# Patient Record
Sex: FEMALE | Race: WHITE | NOT HISPANIC OR LATINO | Employment: UNEMPLOYED | URBAN - METROPOLITAN AREA
[De-identification: names, ages, dates, MRNs, and addresses within clinical notes are randomized per-mention and may not be internally consistent; named-entity substitution may affect disease eponyms.]

---

## 2019-05-02 ENCOUNTER — HOSPITAL ENCOUNTER (OUTPATIENT)
Dept: GENERAL RADIOLOGY | Facility: HOSPITAL | Age: 54
Discharge: HOME | End: 2019-05-02
Attending: INTERNAL MEDICINE

## 2019-05-31 ENCOUNTER — HOSPITAL ENCOUNTER (OUTPATIENT)
Dept: OTHER | Facility: HOSPITAL | Age: 54
Discharge: HOME | End: 2019-05-31
Attending: INTERNAL MEDICINE

## 2019-05-31 LAB
ALBUMIN SERPL-MCNC: 4 G/DL (ref 3.5–5)
ALBUMIN/GLOB SERPL: 1.3 {RATIO} (ref 1.4–2.6)
ALP SERPL-CCNC: 45 U/L (ref 53–141)
ALT SERPL-CCNC: 8 U/L (ref 10–40)
ANION GAP SERPL CALC-SCNC: 14 MMOL/L (ref 8–19)
AST SERPL-CCNC: 17 U/L (ref 15–50)
BASOPHILS # BLD AUTO: 0.02 10*3/UL (ref 0–0.2)
BASOPHILS NFR BLD AUTO: 0.4 % (ref 0–3)
BILIRUB SERPL-MCNC: 0.22 MG/DL (ref 0.2–1.3)
BUN SERPL-MCNC: 8 MG/DL (ref 5–25)
BUN/CREAT SERPL: 9 {RATIO} (ref 6–20)
CALCIUM SERPL-MCNC: 9.3 MG/DL (ref 8.7–10.4)
CHLORIDE SERPL-SCNC: 100 MMOL/L (ref 99–111)
CONV ABS IMM GRAN: 0.01 10*3/UL (ref 0–0.2)
CONV CO2: 26 MMOL/L (ref 22–32)
CONV IMMATURE GRAN: 0.2 % (ref 0–1.8)
CONV TOTAL PROTEIN: 7.1 G/DL (ref 6.3–8.2)
CREAT UR-MCNC: 0.86 MG/DL (ref 0.5–0.9)
CRP SERPL-MCNC: 5.2 MG/L (ref 0–5)
DEPRECATED RDW RBC AUTO: 42.7 FL (ref 36.4–46.3)
EOSINOPHIL # BLD AUTO: 0.34 10*3/UL (ref 0–0.7)
EOSINOPHIL # BLD AUTO: 7.2 % (ref 0–7)
ERYTHROCYTE [DISTWIDTH] IN BLOOD BY AUTOMATED COUNT: 12.5 % (ref 11.7–14.4)
ERYTHROCYTE [SEDIMENTATION RATE] IN BLOOD: 8 MM/H (ref 0–30)
GFR SERPLBLD BASED ON 1.73 SQ M-ARVRAT: >60 ML/MIN/{1.73_M2}
GLOBULIN UR ELPH-MCNC: 3.1 G/DL (ref 2–3.5)
GLUCOSE SERPL-MCNC: 107 MG/DL (ref 65–99)
HBA1C MFR BLD: 11.2 G/DL (ref 12–16)
HCT VFR BLD AUTO: 33.4 % (ref 37–47)
LYMPHOCYTES # BLD AUTO: 1.47 10*3/UL (ref 1–5)
MCH RBC QN AUTO: 31.1 PG (ref 27–31)
MCHC RBC AUTO-ENTMCNC: 33.5 G/DL (ref 33–37)
MCV RBC AUTO: 92.8 FL (ref 81–99)
MONOCYTES # BLD AUTO: 0.24 10*3/UL (ref 0.2–1.2)
MONOCYTES NFR BLD AUTO: 5.1 % (ref 3–10)
NEUTROPHILS # BLD AUTO: 2.64 10*3/UL (ref 2–8)
NEUTROPHILS NFR BLD AUTO: 56 % (ref 30–85)
NRBC CBCN: 0 % (ref 0–0.7)
OSMOLALITY SERPL CALC.SUM OF ELEC: 281 MOSM/KG (ref 273–304)
PLATELET # BLD AUTO: 291 10*3/UL (ref 130–400)
PMV BLD AUTO: 9.6 FL (ref 9.4–12.3)
POTASSIUM SERPL-SCNC: 4 MMOL/L (ref 3.5–5.3)
RBC # BLD AUTO: 3.6 10*6/UL (ref 4.2–5.4)
RETICS # AUTO: 0.05 10*6/UL (ref 0.02–0.08)
RETICS/RBC NFR AUTO: 1.5 % (ref 0.5–1.7)
SODIUM SERPL-SCNC: 136 MMOL/L (ref 135–147)
TSH SERPL-ACNC: 0.85 M[IU]/L (ref 0.27–4.2)
VARIANT LYMPHS NFR BLD MANUAL: 31.1 % (ref 20–45)
WBC # BLD AUTO: 4.72 10*3/UL (ref 4.8–10.8)

## 2019-06-03 LAB
A FUMIGATUS AB SER QL ID: <0.1 K[IU]/ML
BERMUDA GRASS IGE QN: <0.1 K[IU]/ML (ref 0–0.35)
CAT DANDER IGG QN: <0.1 K[IU]/ML (ref 0–0.35)
CLADOSPORIUM IGE: <0.1 K[IU]/ML
COMMON RAGWEED IGE QN: <0.1 K[IU]/ML (ref 0–0.35)
COTTONWOOD IGE QN: <0.1 K[IU]/ML
COW EPITH IGE QN: <0.1 K[IU]/ML
CULTIVATED RYE IGE QN: <0.1
D FARINAE IGE QN: <0.1 K[IU]/ML (ref 0–0.35)
D PTERONYSS IGE QN: <0.1 K[IU]/ML (ref 0–0.35)
DOG DANDER IGE QN: <0.1 K[IU]/ML (ref 0–0.35)
ENGL PLANTAIN IGE QN: <0.1
GOOSEFOOT IGE QN: <0.1 K[IU]/ML (ref 0–0.35)
HORSE EPITH IGE QN: <0.1 K[IU]/ML
HOUSE DUST GREER IGE QN: <0.1 K[IU]/ML (ref 0–0.35)
IGE SERPL-ACNC: 12 K[IU]/ML (ref 0–24)
IMMUNOCAP RESULT: NORMAL (ref 0–0)
JOHNSON GRASS IGE QN: <0.1 K[IU]/ML (ref 0–0.35)
MEADOW FESCUE IGE QN: <0.1 K[IU]/ML (ref 0–0.35)
MESQUITE IGE QN: <0.1
MOLD IGE: <0.1 K[IU]/ML (ref 0–0.35)
MT JUNIPER IGE QN: <0.1 K[IU]/ML
MUGWORT IGE QN: <0.1
OAK DUST IGE QN: <0.1 K[IU]/ML (ref 0–0.35)
OLIVE POLN IGE QN: <0.1
P NOTATUM IGE QN: <0.1 K[IU]/ML
SALTWORT IGE QN: <0.1
TIMOTHY IGE QN: <0.1 K[IU]/ML
WHITE ELM IGE QN: <0.1 K[IU]/ML (ref 0–0.35)

## 2019-06-06 ENCOUNTER — HOSPITAL ENCOUNTER (OUTPATIENT)
Dept: GENERAL RADIOLOGY | Facility: HOSPITAL | Age: 54
Discharge: HOME | End: 2019-06-06
Attending: OBSTETRICS & GYNECOLOGY

## 2019-09-24 ENCOUNTER — HOSPITAL ENCOUNTER (OUTPATIENT)
Dept: OTHER | Facility: HOSPITAL | Age: 54
Discharge: HOME | End: 2019-09-24
Attending: INTERNAL MEDICINE

## 2019-09-26 LAB
BACTERIA SPEC AEROBE CULT: ABNORMAL
CIPROFLOXACIN SUSC ISLT: >=8
CLINDAMYCIN SUSC ISLT: 0.25
DOXYCYCLINE SUSC ISLT: <=0.5
ERYTHROMYCIN SUSC ISLT: >=8
GENTAMICIN SUSC ISLT: <=0.5
LEVOFLOXACIN SUSC ISLT: >=8
OXACILLIN SUSC ISLT: 0.5
RIFAMPIN SUSC ISLT: <=0.5
TETRACYCLINE SUSC ISLT: <=1
TMP SMX SUSC ISLT: <=10
VANCOMYCIN SUSC ISLT: 1

## 2019-11-26 ENCOUNTER — HOSPITAL ENCOUNTER (OUTPATIENT)
Dept: OTHER | Facility: HOSPITAL | Age: 54
Discharge: HOME | End: 2019-11-26
Attending: INTERNAL MEDICINE

## 2019-11-26 LAB
BASOPHILS # BLD AUTO: 0.06 10*3/UL (ref 0–0.2)
BASOPHILS NFR BLD AUTO: 0.8 % (ref 0–3)
CONV ABS IMM GRAN: 0.02 10*3/UL (ref 0–0.2)
CONV IMMATURE GRAN: 0.3 % (ref 0–1.8)
DEPRECATED RDW RBC AUTO: 41.1 FL (ref 36.4–46.3)
EOSINOPHIL # BLD AUTO: 1.24 10*3/UL (ref 0–0.7)
EOSINOPHIL # BLD AUTO: 15.7 % (ref 0–7)
ERYTHROCYTE [DISTWIDTH] IN BLOOD BY AUTOMATED COUNT: 12.2 % (ref 11.7–14.4)
HCT VFR BLD AUTO: 37 % (ref 37–47)
HGB BLD-MCNC: 12.4 G/DL (ref 12–16)
LYMPHOCYTES # BLD AUTO: 2.41 10*3/UL (ref 1–5)
LYMPHOCYTES NFR BLD AUTO: 30.4 % (ref 20–45)
MCH RBC QN AUTO: 30.9 PG (ref 27–31)
MCHC RBC AUTO-ENTMCNC: 33.5 G/DL (ref 33–37)
MCV RBC AUTO: 92.3 FL (ref 81–99)
MONOCYTES # BLD AUTO: 0.51 10*3/UL (ref 0.2–1.2)
MONOCYTES NFR BLD AUTO: 6.4 % (ref 3–10)
NEUTROPHILS # BLD AUTO: 3.68 10*3/UL (ref 2–8)
NEUTROPHILS NFR BLD AUTO: 46.4 % (ref 30–85)
NRBC CBCN: 0 % (ref 0–0.7)
PLATELET # BLD AUTO: 353 10*3/UL (ref 130–400)
PMV BLD AUTO: 9.3 FL (ref 9.4–12.3)
RBC # BLD AUTO: 4.01 10*6/UL (ref 4.2–5.4)
WBC # BLD AUTO: 7.92 10*3/UL (ref 4.8–10.8)

## 2019-12-01 LAB — BACTERIA BLD CULT: NORMAL

## 2019-12-02 ENCOUNTER — HOSPITAL ENCOUNTER (OUTPATIENT)
Dept: GENERAL RADIOLOGY | Facility: HOSPITAL | Age: 54
Discharge: HOME | End: 2019-12-02
Attending: OBSTETRICS & GYNECOLOGY

## 2020-03-16 ENCOUNTER — HOSPITAL ENCOUNTER (OUTPATIENT)
Dept: GENERAL RADIOLOGY | Facility: HOSPITAL | Age: 55
Discharge: HOME | End: 2020-03-16
Attending: OTOLARYNGOLOGY

## 2020-11-23 ENCOUNTER — TELEPHONE CONVERTED (OUTPATIENT)
Dept: GASTROENTEROLOGY | Facility: CLINIC | Age: 55
End: 2020-11-23
Attending: NURSE PRACTITIONER

## 2020-11-30 ENCOUNTER — OFFICE VISIT CONVERTED (OUTPATIENT)
Dept: CARDIOLOGY | Facility: CLINIC | Age: 55
End: 2020-11-30
Attending: INTERNAL MEDICINE

## 2020-12-03 ENCOUNTER — HOSPITAL ENCOUNTER (OUTPATIENT)
Dept: OTHER | Facility: HOSPITAL | Age: 55
Discharge: HOME | End: 2020-12-03
Attending: INTERNAL MEDICINE

## 2020-12-03 LAB
BASOPHILS # BLD AUTO: 0.04 10*3/UL (ref 0–0.2)
BASOPHILS NFR BLD AUTO: 0.7 % (ref 0–3)
CONV ABS IMM GRAN: 0.02 10*3/UL (ref 0–0.2)
CONV IMMATURE GRAN: 0.3 % (ref 0–1.8)
DEPRECATED RDW RBC AUTO: 42.3 FL (ref 36.4–46.3)
EOSINOPHIL # BLD AUTO: 0.04 10*3/UL (ref 0–0.7)
EOSINOPHIL # BLD AUTO: 0.7 % (ref 0–7)
ERYTHROCYTE [DISTWIDTH] IN BLOOD BY AUTOMATED COUNT: 12.5 % (ref 11.7–14.4)
HCT VFR BLD AUTO: 37.2 % (ref 37–47)
HGB BLD-MCNC: 12.4 G/DL (ref 12–16)
LYMPHOCYTES # BLD AUTO: 2.04 10*3/UL (ref 1–5)
LYMPHOCYTES NFR BLD AUTO: 33.5 % (ref 20–45)
MCH RBC QN AUTO: 30.7 PG (ref 27–31)
MCHC RBC AUTO-ENTMCNC: 33.3 G/DL (ref 33–37)
MCV RBC AUTO: 92.1 FL (ref 81–99)
MONOCYTES # BLD AUTO: 0.34 10*3/UL (ref 0.2–1.2)
MONOCYTES NFR BLD AUTO: 5.6 % (ref 3–10)
NEUTROPHILS # BLD AUTO: 3.61 10*3/UL (ref 2–8)
NEUTROPHILS NFR BLD AUTO: 59.2 % (ref 30–85)
NRBC CBCN: 0 % (ref 0–0.7)
PLATELET # BLD AUTO: 374 10*3/UL (ref 130–400)
PMV BLD AUTO: 9.2 FL (ref 9.4–12.3)
RBC # BLD AUTO: 4.04 10*6/UL (ref 4.2–5.4)
WBC # BLD AUTO: 6.09 10*3/UL (ref 4.8–10.8)

## 2020-12-04 ENCOUNTER — OFFICE VISIT CONVERTED (OUTPATIENT)
Dept: CARDIOLOGY | Facility: CLINIC | Age: 55
End: 2020-12-04
Attending: INTERNAL MEDICINE

## 2020-12-04 LAB
ALBUMIN SERPL-MCNC: 4.5 G/DL (ref 3.5–5)
ALBUMIN/GLOB SERPL: 1.4 {RATIO} (ref 1.4–2.6)
ALP SERPL-CCNC: 59 U/L (ref 53–141)
ALT SERPL-CCNC: 13 U/L (ref 10–40)
ANION GAP SERPL CALC-SCNC: 13 MMOL/L (ref 8–19)
AST SERPL-CCNC: 32 U/L (ref 15–50)
BILIRUB SERPL-MCNC: 0.17 MG/DL (ref 0.2–1.3)
BUN SERPL-MCNC: 11 MG/DL (ref 5–25)
BUN/CREAT SERPL: 12 {RATIO} (ref 6–20)
CALCIUM SERPL-MCNC: 10.2 MG/DL (ref 8.7–10.4)
CHLORIDE SERPL-SCNC: 98 MMOL/L (ref 99–111)
CHOLEST SERPL-MCNC: 239 MG/DL (ref 107–200)
CHOLEST/HDLC SERPL: 4.3 {RATIO} (ref 3–6)
CONV CO2: 28 MMOL/L (ref 22–32)
CONV TOTAL PROTEIN: 7.8 G/DL (ref 6.3–8.2)
CREAT UR-MCNC: 0.89 MG/DL (ref 0.5–0.9)
GFR SERPLBLD BASED ON 1.73 SQ M-ARVRAT: >60 ML/MIN/{1.73_M2}
GLOBULIN UR ELPH-MCNC: 3.3 G/DL (ref 2–3.5)
GLUCOSE SERPL-MCNC: 82 MG/DL (ref 65–99)
HDLC SERPL-MCNC: 55 MG/DL (ref 40–60)
LDLC SERPL CALC-MCNC: 148 MG/DL (ref 70–100)
OSMOLALITY SERPL CALC.SUM OF ELEC: 278 MOSM/KG (ref 273–304)
POTASSIUM SERPL-SCNC: 3.8 MMOL/L (ref 3.5–5.3)
SODIUM SERPL-SCNC: 135 MMOL/L (ref 135–147)
T4 FREE SERPL-MCNC: 1.1 NG/DL (ref 0.9–1.8)
TRIGL SERPL-MCNC: 179 MG/DL (ref 40–150)
TSH SERPL-ACNC: 0.92 M[IU]/L (ref 0.27–4.2)
VLDLC SERPL-MCNC: 36 MG/DL (ref 5–37)

## 2020-12-05 ENCOUNTER — HOSPITAL ENCOUNTER (OUTPATIENT)
Dept: PREADMISSION TESTING | Facility: HOSPITAL | Age: 55
Discharge: HOME | End: 2020-12-05
Attending: INTERNAL MEDICINE

## 2020-12-06 LAB — SARS-COV-2 RNA SPEC QL NAA+PROBE: NOT DETECTED

## 2020-12-07 ENCOUNTER — OFFICE VISIT CONVERTED (OUTPATIENT)
Dept: CARDIOLOGY | Facility: CLINIC | Age: 55
End: 2020-12-07
Attending: INTERNAL MEDICINE

## 2020-12-07 ENCOUNTER — CONVERSION ENCOUNTER (OUTPATIENT)
Dept: OTHER | Facility: HOSPITAL | Age: 55
End: 2020-12-07

## 2020-12-10 ENCOUNTER — HOSPITAL ENCOUNTER (OUTPATIENT)
Dept: GASTROENTEROLOGY | Facility: HOSPITAL | Age: 55
Setting detail: HOSPITAL OUTPATIENT SURGERY
Discharge: HOME | End: 2020-12-10
Attending: INTERNAL MEDICINE

## 2021-01-04 ENCOUNTER — OFFICE VISIT CONVERTED (OUTPATIENT)
Dept: CARDIOLOGY | Facility: CLINIC | Age: 56
End: 2021-01-04
Attending: INTERNAL MEDICINE

## 2021-01-07 ENCOUNTER — OFFICE VISIT CONVERTED (OUTPATIENT)
Dept: GASTROENTEROLOGY | Facility: CLINIC | Age: 56
End: 2021-01-07
Attending: NURSE PRACTITIONER

## 2021-01-26 ENCOUNTER — HOSPITAL ENCOUNTER (OUTPATIENT)
Dept: GENERAL RADIOLOGY | Facility: HOSPITAL | Age: 56
Discharge: HOME | End: 2021-01-26
Attending: OBSTETRICS & GYNECOLOGY

## 2021-05-10 NOTE — H&P
History and Physical      Patient Name: Lisa To   Patient ID: [id number]   Sex: Female   YOB: 1965    Primary Care Provider: Rafita Schmidt MD   Referring Provider: Rafita Schmidt MD    Visit Date: November 23, 2020    Provider: BELEN Aguilar   Location: [facility] [address]   Location Address: [address] [place] [address]  [id number]   Location Phone: [phone]          Chief Complaint  · Lower ABD pain   · acid reflux   · burping acid       History Of Present Illness  TELEHEALTH TELEPHONE VISIT  Lisa To is a 54 year old /White female who is presenting for evaluation via telehealth telephone visit. Verbal consent obtained before beginning visit.   Provider spent 15 minutes with the patient during the telehealth visit.   The following staff were present during this visit: Shawn Fajardo MA ; Akilah TIRADO   Past Medical History/ Overview of Patient Symptoms     New pt w c/o abd pain, feels pain coming up into esophagus. Pt also has lower abd pain, feels radiates left across to right. Also has upper abd pain w meals. Taking TUMS and has been on Nexium for the last 4 days and states it is helping. Pt c/o HB. Has had some nausea, no vomiting. No diarrhea. Denies constipation. No fever. Pt has been taking NSAIDs.   No unint wt loss     Last colonoscopy 5/22/2017: Adequate prep, completely negative       Past Medical History  Allergic rhinitis; Anxiety; Pain, Lumbar         Past Surgical History  Cesarian Section; Colonoscopy; Hysterectomy         Medication List  Name Date Started Instructions   alprazolam Oral  --    Claritin 10 mg oral tablet  take 1 tablet (10 mg) by oral route once daily   estradiol 1 mg oral tablet  take 1 tablet (1 mg) by oral route once daily   hydrocodone-acetaminophen  mg oral tablet  take 1 tablet by oral route every 6 hours as needed for pain         Allergy List  Morphine Sulfate; SULFA (SULFONAMIDES)        Allergies Reconciled  Family Medical History  Cancer, Unspecified; Cerebrovascular disease; Diabetes, unspecified type; Family history of colon cancer         Social History  Tobacco (Never)             Assessment  · Abdominal pain     789.00/R10.9  upper and lower  · Heartburn     787.1/R12  · Nausea     787.02/R11.0      Plan  · Orders  o [facility] Pre-Op Covid-19 Screening (94639) - - 11/23/2020  · Medications  o NuLYTELY with Flavor Packs 420 gram oral recon soln   SIG: take as directed for 1 day per office instructions   DISP: (1) Box with 0 refills  Prescribed on 11/23/2020     o Medications have been Reconciled  o Transition of Care or Provider Policy  · Instructions  o Plan Of Care:   o Patient instructed to seek medical attention urgently for new or worsening symptoms.  o Call the office with any concerns or questions.  o EGD/Colonoscopy r/b d/w pt for upper and lower abd pain , HB, nausea; anesthesia per IV protocol.   o Stop NSAIDs, continue Nexium OTC            Electronically Signed by: BELEN Aguilar -Author on November 23, 2020 02:18:15 PM

## 2021-05-10 NOTE — PROCEDURES
"   Procedure Note      Patient Name: Lisa To   Patient ID: [id number]   Sex: Female   YOB: 1965    Primary Care Provider: Rafita Schmidt MD   Referring Provider: Rafita Schmidt MD    Visit Date: December 7, 2020    Provider: Miguel Brady MD   [facility]   Location Address: [address] [place] [address], [address]   [place]  [id number]   Location Phone: [phone]          FINAL REPORT   TRANSTHORACIC ECHOCARDIOGRAM REPORT    Diagnosis: Chest pain, precordial   Height: 4'11\" Weight: 113 B/P: 128/64 BSA: 1.5   Tech: JTW   MEASUREMENTS:  RVID (Diastole) : RVID. (NORMAL: 0.7 to 2.4 cm max)   LVID (Systole): 2.6 cm (Diastole): 3.9 cm . (NORMAL: 3.7 - 5.4 cm)   Posterior Wall Thickness (Diastole): 0.9 cm. (NORMAL: 0.8 - 1.1 cm)   Septal Thickness (Diastole): 0.9 cm. (NORMAL: 0.7 - 1.2 cm)   LAID (Systole): 3.0 cm. (NORMAL: 1.9 - 3.8 cm)   Aortic Root Diameter (Diastole): 2.9 cm. (NORMAL: 2.0 - 3.7 cm)   COMMENTS:  The patient underwent 2-D, M-Mode, and Doppler examination, including pulse-wave, continuous-wave, and color-flow analysis; the study is technically adequate.   FINDINGS:  MITRAL VALVE: Normal. E to F slope was normal. No evidence of any prolapse.   AORTIC VALVE: Normal with three cusps. Normal central closure. No evidence of any obstruction.   TRICUSPID VALVE: Normal.   PULMONIC VALVE: Normal.   LEFT ATRIUM: Normal; no masses seen. LA volume is 16 mL/m2.   AORTIC ROOT: Normal in size and motion.   LEFT VENTRICLE: The left ventricular chamber size is normal. The left ventricular wall thickness is normal. The left ventricular systolic function is normal with an estimated EF of 60%. No significant regional wall motion abnormalities are identified.   RIGHT ATRIUM: Normal.   RIGHT VENTRICLE: Normal size and function.   PERICARDIUM: No effusion.   INFERIOR VENA CAVA: Diameter is 1.5 cm with greater than 50% reduction with inspiration.   DOPPLER: Pulse-wave, continuous wave, " and color-flow Doppler evaluation was performed. E/A ratio is 1.2. DT= 216 msec. IVRT is 121 msec. E/E' is 7. There is trace mitral valve regurgitation of no hemodynamic consequence. There is tricuspid regurgitation with normal estimated pulmonary artery systolic pressure.   Faxed: 12/08/2020      CONCLUSION:  1.  Left ventricular chamber size is normal. The left ventricular wall thickness is normal. The left ventricular        systolic function is normal with an estimated EF of 60%. No significant regional wall motion abnormalities        are identified.   2.  Normal left ventricular diastolic function.   3.  Trace mitral valve regurgitation of no hemodynamic consequence.   4.  Tricuspid regurgitation with normal estimated pulmonary artery systolic pressure.      Miguel Brady MD, [facility]  PM/pap    This note was transcribed by Ethel Ramirez.  pap/pm  The above service was transcribed by Ethel Ramirez, and I attest to the accuracy of the note.  PM                 Electronically Signed by: Yanelis Ramirez-, Other -Author on December 8, 2020 04:05:59 PM  Electronically Co-signed by: Miguel Brady MD -Reviewer on December 19, 2020 12:25:24 AM

## 2021-05-10 NOTE — PROCEDURES
Procedure Note      Patient Name: Lisa To   Patient ID: [id number]   Sex: Female   YOB: 1965    Primary Care Provider: Rafita Schmidt MD   Referring Provider: Rafita Schmidt MD    Visit Date: December 4, 2020    Provider: Miguel Brady MD   Location: [facility]   Location Address: [address] [place] [address], [facility]   [place]  [id number]   Location Phone: [phone]          FINAL REPORT   HOLTER MONITOR REPORT  Date: 12/04/2020   Indication: Palpitations      The patient underwent 24 hours of Holter monitoring. 84% of the data was analyzable. Minimum heart rate of 49 beats per minute occurred at 2:44 AM.  Maximum heart rate of 102 beats per minute occurred at 11:05 PM.  Average heart rate was 56 beats per minute.  There was 1 PVC and 33 APCs. Underlying rhythm was sinus. No two-second pauses noted. The patient complained of chest pain on multiple occasions. No sustained tachy- or bradyarrhythmias noted.  During patient symptomatology, the underlying rhythm was sinus with heart rate of 50-75 beats per minute without any significant arrhythmias or ST segment changes.     CONCLUSION:    1.  Twenty-four-hour Holter monitor reveals sinus rhythm without any significant tachy- or bradyarrhythmias.    2.  No correlation with patient symptomatology.       Miguel Brady MD, [facility]  PM/pap    This note was transcribed by Ethel Ramirez.  pap/pm  The above service was transcribed by Ethel Ramirez, and I attest to the accuracy of the note.  PM                 Electronically Signed by: Yanelis Ramirez-, Other -Author on December 9, 2020 08:25:44 AM  Electronically Co-signed by: Miguel Brady MD -Reviewer on December 19, 2020 12:26:21 AM

## 2021-05-14 VITALS
SYSTOLIC BLOOD PRESSURE: 118 MMHG | BODY MASS INDEX: 22.08 KG/M2 | HEIGHT: 59 IN | TEMPERATURE: 97.2 F | DIASTOLIC BLOOD PRESSURE: 59 MMHG | WEIGHT: 109.5 LBS | HEART RATE: 61 BPM

## 2021-05-14 VITALS
WEIGHT: 113 LBS | BODY MASS INDEX: 22.78 KG/M2 | HEIGHT: 59 IN | HEART RATE: 70 BPM | SYSTOLIC BLOOD PRESSURE: 128 MMHG | DIASTOLIC BLOOD PRESSURE: 64 MMHG

## 2021-05-14 VITALS
SYSTOLIC BLOOD PRESSURE: 120 MMHG | WEIGHT: 114 LBS | HEIGHT: 59 IN | BODY MASS INDEX: 22.98 KG/M2 | DIASTOLIC BLOOD PRESSURE: 61 MMHG | HEART RATE: 48 BPM

## 2021-05-14 VITALS
DIASTOLIC BLOOD PRESSURE: 50 MMHG | HEART RATE: 56 BPM | SYSTOLIC BLOOD PRESSURE: 110 MMHG | WEIGHT: 109 LBS | BODY MASS INDEX: 21.97 KG/M2 | HEIGHT: 59 IN

## 2021-05-14 NOTE — PROGRESS NOTES
Progress Note      Patient Name: Lisa To   Patient ID: [id number]   Sex: Female   YOB: 1965    Primary Care Provider: Rafita Schmidt MD   Referring Provider: Zoila GLOVER    Visit Date: January 7, 2021    Provider: BELEN Aguilar   [facility]   Location Address: [address] [place] [address]  [id number]   Location Phone: [phone]          Chief Complaint  · follow up   · acid reflux       History Of Present Illness  Lisa To is a 55 year old /White female who presents to the office today.      Patient initially presented November 2020 with abdominal pain that radiated into esophagus.  Also complained of lower abdominal pain.  Had recently started Nexium and it was held.  Patient was on NSAIDs, she was advised to discontinue.    EGD colonoscopy 12/10/2020: Small hiatal hernia, normal esophagus--biopsy with reflux esophagitis otherwise negative, normal stomach--biopsy with gastropathy with minimal chronic gastritis, negative for H. pylori and duodenum; good prep, normal mucosa throughout the colon.  Metamucil and Protonix given    Pt states she feels burning in upper abdomen after eating/drinking. States she feels food contents move into LUQ and has burning. Also states she has RLQ/LLQ discomfort that is improved with BM. Pt has stayed away from NSAIDs. Pt took Protonix for 1 mo and helped some, but she's out now. Pt tearful at times. Pt was very fixated on talking about surgery to fix her problems. I explained that surgery was not indicated at this time for her.       Past Medical History  Allergic rhinitis; Anxiety; Pain, Lumbar         Past Surgical History  Cesarian Section; Colonoscopy; Hysterectomy         Medication List  Name Date Started Instructions   alprazolam Oral  --    Claritin 10 mg oral tablet  take 1 tablet (10 mg) by oral route once daily   estradiol 1 mg oral tablet  take 1 tablet (1 mg) by oral  route once daily   hydrocodone-acetaminophen  mg oral tablet  take 1 tablet by oral route every 6 hours as needed for pain         Allergy List  Morphine Sulfate; SULFA (SULFONAMIDES)         Family Medical History  Cancer, Unspecified; Cerebrovascular disease; Diabetes, unspecified type; Family history of colon cancer         Social History  Tobacco (Never)         Review of Systems  · Constitutional  o Admits  o : good general health lately, no acute distress  · Gastrointestinal  o Denies  o : additional gastrointestinal symptoms except as noted in the HPI  · Psychiatric  o Admits  o : anxiety, pleasant affect      Physical Examination  · Constitutional  o Appearance  o : well developed, well-nourished, in no acute distress  · Head and Face  o Head  o :   § Inspection  § : atraumatic, normocephalic  · Eyes  o Sclerae  o : sclerae white, no sclerae icterus  · Neck  o Inspection/Palpation  o : supple  · Respiratory  o Respiratory Effort  o : breathing unlabored  o Inspection of Chest  o : normal appearance, no retractions  · Cardiovascular  o Peripheral Vascular System  o :   § Extremities  § : no cyanosis, clubbing or edema  · Skin and Subcutaneous Tissue  o General Inspection  o : no lesions present, no rashes present  · Neurologic  o Mental Status Examination  o :   § Orientation  § : grossly oriented to person, place and time  § Speech/Language  § : communication ability within normal limits, voice quality normal, articulation of speech normal, no evidence of aphasia  § Attention  § : attention normal, concentration abilities normal  o Sensation  o : grossly intact  o Gait and Station  o :   § Gait Screening  § : normal gait  · Psychiatric  o General  o : Alert and oriented x3  o Mood and Affect  o : Mood and affect are appropriate to circumstances          Assessment  · Abdominal pain     789.00/R10.9  · Hiatal hernia     553.3/K44.9  · Reflux  esophagitis     530.11/K21.0      Plan  · Medications  o Protonix 40 mg oral tablet,delayed release (DR/EC)   SIG: take 1 tablet (40 mg) by oral route once daily for 30 days   DISP: (30) Tablet with 2 refills  Prescribed on 01/07/2021     · Instructions  o Encouraged to follow-up with Primary Care Provider for preventative care.  o Patient was educated/instructed on their diagnosis, treatment and medications prior to discharge from the clinic today.  o Patient instructed to seek medical attention urgently for new or worsening symptoms.  o Lifestyle modifications discussed to include small frequent meals and NPO 4 hours before bedtime.  o Follow Up in 2 months.  o Call if not improving in 2 weeks, if still w abd pain can do CT. This was d/w pt today as she was very nervous about her sx   o Cont to avoid NSAIDs            Electronically Signed by: BELEN Aguilar -Author on January 11, 2021 11:06:05 AM

## 2021-05-14 NOTE — PROGRESS NOTES
"   Progress Note      Patient Name: Lisa To   Patient ID: [id number]   Sex: Female   YOB: 1965    Primary Care Provider: Rafita Schmidt MD   Referring Provider: Rafita Schmidt MD    Visit Date: January 4, 2021    Provider: Miguel Brady MD   Location: [facility]   Location Address: [address] [place] [address], [address]   [place], [place]  [id number]   Location Phone: [phone]          Chief Complaint     Chest pain.       History Of Present Illness  REFERRING PROVIDER: Rafita Schmidt MD   Lisa To is a 55 year old /White female who states her chest pain described as on her left flank area resolved after she started taking her metoprolol. She has not had any since. She denies any palpitations, shortness of breath, swelling, dizziness, syncope, PND, or orthopnea. She admits she does not get any regular exercise. She has lost 5 pounds since she was last here.   PAST MEDICAL HISTORY: Negative for hypertension, diabetes or myocardial infarction. Positive for anxiety disorder and irregular heart rate.   PSYCHOSOCIAL HISTORY: Denies tobacco use. Denies alcohol use.   CURRENT MEDICATIONS: Hormone replacement therapy; alprazolam 1 mg p.r.n.; metoprolol succinate ER 25 mg 1/2 tab q. h.s.      ALLERGIES:  Morphine; sulfa drugs (SULFONAMIDES).       Review of Systems  · Cardiovascular  o Admits  o : chest pain or angina pectoris   o Denies  o : palpitations (fast, fluttering, or skipping beats), swelling (feet, ankles, hands), shortness of breath while walking or lying flat  · Respiratory  o Denies  o : chronic or frequent cough      Vitals  Date Time BP Position Site L\R Cuff Size HR RR TEMP (F) WT  HT  BMI kg/m2 BSA m2 O2 Sat FR L/min FiO2 HC       01/04/2021 01:40 /50 Sitting    56 - R   109lbs 0oz 4'  11\" 22.02 1.43             Physical Examination  · Constitutional  o Appearance  o : Awake, alert, in no acute distress, appears anxious at times.  · Eyes  o Conjunctivae  o : " Conjunctivae normal.  · Ears, Nose, Mouth and Throat  o Oral Cavity  o :   § Oral Mucosa  § : Normal.  · Neck  o Jugular Veins  o : No JVD. Good carotid upstroke. No bruits noted.  · Respiratory  o Respiratory  o : Good respiratory effort. Clear to percussion and auscultation.  · Cardiovascular  o Heart  o : PMI is not well felt. S1, S2 normal. No S3. No S4.   o Peripheral Vascular System  o :   § Extremities  § : Good femoral and pedal pulses. No pedal edema.  · Gastrointestinal  o Abdominal Examination  o : Soft. No tenderness or masses felt. No hepatosplenomegaly. Abdominal aorta is not palpable.  · Labs  o Labs  o : Lipids from December 3rd: Total cholesterol 239, triglycerides 179, HDL 55, .  · Diagnostic Testing  o Diagnostic Testing  o : Her stress test, her echo, and her Holter were reviewed.          Assessment     1.  Chest pain, resolved.  2.  Hyperlipidemia.       Plan     1.  Will do a coronary calcium score to evaluate the need for a statin drug.  2.  Encouraged her to continue her metoprolol for her atypical chest pain.    3.  Follow up in 6 months with another lipid panel.        BELEN Henderson/Miguel Brady MD, FACC  JF:PM:vm               Electronically Signed by: Yumi Bonner-, Other -Author on January 6, 2021 12:40:08 PM  Electronically Co-signed by: BELEN Mares -Reviewer on January 7, 2021 08:15:35 AM  Electronically Co-signed by: Miguel Brady MD -Reviewer on January 7, 2021 06:07:16 PM

## 2021-06-24 ENCOUNTER — TELEPHONE (OUTPATIENT)
Dept: CARDIOLOGY | Facility: CLINIC | Age: 56
End: 2021-06-24

## 2021-06-24 NOTE — TELEPHONE ENCOUNTER
Received VM from patient stating that she needed an appt with Dr. Brady to discuss her beta-blocker and to go over her heart.     Attempted to contact patient. Left VM.

## 2021-06-29 ENCOUNTER — APPOINTMENT (OUTPATIENT)
Dept: GENERAL RADIOLOGY | Facility: HOSPITAL | Age: 56
End: 2021-06-29

## 2021-06-29 ENCOUNTER — TELEPHONE (OUTPATIENT)
Dept: CARDIOLOGY | Facility: CLINIC | Age: 56
End: 2021-06-29

## 2021-06-29 LAB
ALBUMIN SERPL-MCNC: 4.7 G/DL (ref 3.5–5.2)
ALBUMIN/GLOB SERPL: 1.6 G/DL
ALP SERPL-CCNC: 52 U/L (ref 39–117)
ALT SERPL W P-5'-P-CCNC: 10 U/L (ref 1–33)
ANION GAP SERPL CALCULATED.3IONS-SCNC: 14.7 MMOL/L (ref 5–15)
AST SERPL-CCNC: 17 U/L (ref 1–32)
BASOPHILS # BLD AUTO: 0.05 10*3/MM3 (ref 0–0.2)
BASOPHILS NFR BLD AUTO: 0.8 % (ref 0–1.5)
BILIRUB SERPL-MCNC: 0.2 MG/DL (ref 0–1.2)
BUN SERPL-MCNC: 12 MG/DL (ref 6–20)
BUN/CREAT SERPL: 12 (ref 7–25)
CALCIUM SPEC-SCNC: 9.4 MG/DL (ref 8.6–10.5)
CHLORIDE SERPL-SCNC: 97 MMOL/L (ref 98–107)
CK MB SERPL-CCNC: 2.25 NG/ML
CK SERPL-CCNC: 90 U/L (ref 20–180)
CO2 SERPL-SCNC: 26.3 MMOL/L (ref 22–29)
CREAT SERPL-MCNC: 1 MG/DL (ref 0.57–1)
DEPRECATED RDW RBC AUTO: 39.5 FL (ref 37–54)
EOSINOPHIL # BLD AUTO: 0.22 10*3/MM3 (ref 0–0.4)
EOSINOPHIL NFR BLD AUTO: 3.6 % (ref 0.3–6.2)
ERYTHROCYTE [DISTWIDTH] IN BLOOD BY AUTOMATED COUNT: 12 % (ref 12.3–15.4)
GFR SERPL CREATININE-BSD FRML MDRD: 58 ML/MIN/1.73
GLOBULIN UR ELPH-MCNC: 2.9 GM/DL
GLUCOSE SERPL-MCNC: 104 MG/DL (ref 65–99)
HCT VFR BLD AUTO: 33.3 % (ref 34–46.6)
HGB BLD-MCNC: 11.3 G/DL (ref 12–15.9)
HOLD SPECIMEN: NORMAL
HOLD SPECIMEN: NORMAL
IMM GRANULOCYTES # BLD AUTO: 0.01 10*3/MM3 (ref 0–0.05)
IMM GRANULOCYTES NFR BLD AUTO: 0.2 % (ref 0–0.5)
LIPASE SERPL-CCNC: 72 U/L (ref 13–60)
LYMPHOCYTES # BLD AUTO: 1.66 10*3/MM3 (ref 0.7–3.1)
LYMPHOCYTES NFR BLD AUTO: 27.1 % (ref 19.6–45.3)
MAGNESIUM SERPL-MCNC: 1.9 MG/DL (ref 1.6–2.6)
MCH RBC QN AUTO: 30.8 PG (ref 26.6–33)
MCHC RBC AUTO-ENTMCNC: 33.9 G/DL (ref 31.5–35.7)
MCV RBC AUTO: 90.7 FL (ref 79–97)
MONOCYTES # BLD AUTO: 0.41 10*3/MM3 (ref 0.1–0.9)
MONOCYTES NFR BLD AUTO: 6.7 % (ref 5–12)
NEUTROPHILS NFR BLD AUTO: 3.78 10*3/MM3 (ref 1.7–7)
NEUTROPHILS NFR BLD AUTO: 61.6 % (ref 42.7–76)
NRBC BLD AUTO-RTO: 0 /100 WBC (ref 0–0.2)
NT-PROBNP SERPL-MCNC: 281.8 PG/ML (ref 0–900)
PLATELET # BLD AUTO: 351 10*3/MM3 (ref 140–450)
PMV BLD AUTO: 9.2 FL (ref 6–12)
POTASSIUM SERPL-SCNC: 4.2 MMOL/L (ref 3.5–5.2)
PROT SERPL-MCNC: 7.6 G/DL (ref 6–8.5)
RBC # BLD AUTO: 3.67 10*6/MM3 (ref 3.77–5.28)
SODIUM SERPL-SCNC: 138 MMOL/L (ref 136–145)
WBC # BLD AUTO: 6.13 10*3/MM3 (ref 3.4–10.8)
WHOLE BLOOD HOLD SPECIMEN: NORMAL

## 2021-06-29 PROCEDURE — 82550 ASSAY OF CK (CPK): CPT | Performed by: EMERGENCY MEDICINE

## 2021-06-29 PROCEDURE — 84484 ASSAY OF TROPONIN QUANT: CPT

## 2021-06-29 PROCEDURE — 83735 ASSAY OF MAGNESIUM: CPT | Performed by: EMERGENCY MEDICINE

## 2021-06-29 PROCEDURE — 80053 COMPREHEN METABOLIC PANEL: CPT | Performed by: EMERGENCY MEDICINE

## 2021-06-29 PROCEDURE — 93005 ELECTROCARDIOGRAM TRACING: CPT

## 2021-06-29 PROCEDURE — 93005 ELECTROCARDIOGRAM TRACING: CPT | Performed by: EMERGENCY MEDICINE

## 2021-06-29 PROCEDURE — 85025 COMPLETE CBC W/AUTO DIFF WBC: CPT | Performed by: EMERGENCY MEDICINE

## 2021-06-29 PROCEDURE — 71045 X-RAY EXAM CHEST 1 VIEW: CPT

## 2021-06-29 PROCEDURE — 93010 ELECTROCARDIOGRAM REPORT: CPT | Performed by: SPECIALIST

## 2021-06-29 PROCEDURE — 82553 CREATINE MB FRACTION: CPT | Performed by: EMERGENCY MEDICINE

## 2021-06-29 PROCEDURE — 83690 ASSAY OF LIPASE: CPT | Performed by: EMERGENCY MEDICINE

## 2021-06-29 PROCEDURE — 99283 EMERGENCY DEPT VISIT LOW MDM: CPT

## 2021-06-29 PROCEDURE — 83880 ASSAY OF NATRIURETIC PEPTIDE: CPT | Performed by: EMERGENCY MEDICINE

## 2021-06-29 RX ORDER — ASPIRIN 81 MG/1
324 TABLET, CHEWABLE ORAL ONCE
Status: DISCONTINUED | OUTPATIENT
Start: 2021-06-29 | End: 2021-06-30

## 2021-06-29 RX ORDER — SODIUM CHLORIDE 0.9 % (FLUSH) 0.9 %
10 SYRINGE (ML) INJECTION AS NEEDED
Status: DISCONTINUED | OUTPATIENT
Start: 2021-06-29 | End: 2021-06-30 | Stop reason: HOSPADM

## 2021-06-29 NOTE — TELEPHONE ENCOUNTER
"Received VM from patient stating that she had some questions.    Returned call. Patient stated that she would like some clarification on why she is on a beta-blocker. Advised that per the progress notes Dr. Brady had continued her beta blocker for atypical CP. Patient inquired on if \"beta blocker would beta block her hormone medication.\" Advised patient that she should consult her pharmacist for drug interactions.     Reminder patient of appt on 9/1/2021. All questions answered.  "

## 2021-06-30 ENCOUNTER — APPOINTMENT (OUTPATIENT)
Dept: CT IMAGING | Facility: HOSPITAL | Age: 56
End: 2021-06-30

## 2021-06-30 ENCOUNTER — HOSPITAL ENCOUNTER (EMERGENCY)
Facility: HOSPITAL | Age: 56
Discharge: HOME | End: 2021-06-30
Attending: EMERGENCY MEDICINE | Admitting: EMERGENCY MEDICINE

## 2021-06-30 VITALS
HEART RATE: 79 BPM | WEIGHT: 114.2 LBS | RESPIRATION RATE: 17 BRPM | DIASTOLIC BLOOD PRESSURE: 63 MMHG | SYSTOLIC BLOOD PRESSURE: 112 MMHG | OXYGEN SATURATION: 95 % | HEIGHT: 59 IN | BODY MASS INDEX: 23.02 KG/M2 | TEMPERATURE: 97.9 F

## 2021-06-30 DIAGNOSIS — N30.00 ACUTE CYSTITIS WITHOUT HEMATURIA: Primary | ICD-10-CM

## 2021-06-30 LAB
BACTERIA UR QL AUTO: ABNORMAL /HPF
BILIRUB UR QL STRIP: NEGATIVE
CLARITY UR: ABNORMAL
COLOR UR: ABNORMAL
D-LACTATE SERPL-SCNC: 0.7 MMOL/L (ref 0.5–2)
GLUCOSE UR STRIP-MCNC: NEGATIVE MG/DL
HGB UR QL STRIP.AUTO: NEGATIVE
HOLD SPECIMEN: NORMAL
HYALINE CASTS UR QL AUTO: ABNORMAL /LPF
KETONES UR QL STRIP: NEGATIVE
LEUKOCYTE ESTERASE UR QL STRIP.AUTO: ABNORMAL
NITRITE UR QL STRIP: POSITIVE
PH UR STRIP.AUTO: 6.5 [PH] (ref 5–8)
PROT UR QL STRIP: NEGATIVE
QT INTERVAL: 459 MS
RBC # UR: ABNORMAL /HPF
REF LAB TEST METHOD: ABNORMAL
SP GR UR STRIP: 1.01 (ref 1–1.03)
SQUAMOUS #/AREA URNS HPF: ABNORMAL /HPF
TROPONIN T SERPL-MCNC: <0.01 NG/ML (ref 0–0.03)
UROBILINOGEN UR QL STRIP: ABNORMAL
WBC UR QL AUTO: ABNORMAL /HPF

## 2021-06-30 PROCEDURE — 87040 BLOOD CULTURE FOR BACTERIA: CPT | Performed by: EMERGENCY MEDICINE

## 2021-06-30 PROCEDURE — 36415 COLL VENOUS BLD VENIPUNCTURE: CPT

## 2021-06-30 PROCEDURE — 83605 ASSAY OF LACTIC ACID: CPT | Performed by: EMERGENCY MEDICINE

## 2021-06-30 PROCEDURE — 96374 THER/PROPH/DIAG INJ IV PUSH: CPT

## 2021-06-30 PROCEDURE — 81001 URINALYSIS AUTO W/SCOPE: CPT | Performed by: EMERGENCY MEDICINE

## 2021-06-30 PROCEDURE — 25010000002 CEFTRIAXONE PER 250 MG: Performed by: EMERGENCY MEDICINE

## 2021-06-30 PROCEDURE — 0 IOPAMIDOL PER 1 ML: Performed by: EMERGENCY MEDICINE

## 2021-06-30 PROCEDURE — 74177 CT ABD & PELVIS W/CONTRAST: CPT

## 2021-06-30 RX ORDER — NITROFURANTOIN 25; 75 MG/1; MG/1
100 CAPSULE ORAL 2 TIMES DAILY
Qty: 14 CAPSULE | Refills: 0 | Status: SHIPPED | OUTPATIENT
Start: 2021-06-30 | End: 2021-06-30

## 2021-06-30 RX ORDER — HYDROCODONE BITARTRATE AND ACETAMINOPHEN 10; 325 MG/1; MG/1
TABLET ORAL
COMMUNITY
Start: 2021-06-18 | End: 2021-10-06

## 2021-06-30 RX ORDER — FLUCONAZOLE 150 MG/1
150 TABLET ORAL ONCE
Qty: 1 TABLET | Refills: 0 | Status: SHIPPED | OUTPATIENT
Start: 2021-06-30 | End: 2021-06-30

## 2021-06-30 RX ORDER — LORATADINE 10 MG/1
10 TABLET ORAL DAILY
COMMUNITY

## 2021-06-30 RX ORDER — CIPROFLOXACIN 500 MG/1
500 TABLET, FILM COATED ORAL 2 TIMES DAILY
Qty: 10 TABLET | Refills: 0 | Status: SHIPPED | OUTPATIENT
Start: 2021-06-30 | End: 2021-07-05

## 2021-06-30 RX ORDER — PANTOPRAZOLE SODIUM 40 MG/1
TABLET, DELAYED RELEASE ORAL
COMMUNITY
Start: 2021-06-14 | End: 2021-08-20

## 2021-06-30 RX ORDER — ESTRADIOL 1 MG/1
TABLET ORAL
COMMUNITY
End: 2021-10-28

## 2021-06-30 RX ORDER — METOPROLOL SUCCINATE 25 MG/1
25 TABLET, EXTENDED RELEASE ORAL
COMMUNITY
Start: 2021-06-29 | End: 2021-12-17

## 2021-06-30 RX ORDER — DIAZEPAM 5 MG/1
2.5-5 TABLET ORAL 2 TIMES DAILY PRN
COMMUNITY
Start: 2021-05-04 | End: 2021-10-06

## 2021-06-30 RX ADMIN — CEFTRIAXONE SODIUM 1 G: 1 INJECTION, POWDER, FOR SOLUTION INTRAMUSCULAR; INTRAVENOUS at 05:14

## 2021-06-30 RX ADMIN — IOPAMIDOL 100 ML: 755 INJECTION, SOLUTION INTRAVENOUS at 04:41

## 2021-07-05 LAB — BACTERIA SPEC AEROBE CULT: NORMAL

## 2021-08-20 RX ORDER — PANTOPRAZOLE SODIUM 40 MG/1
TABLET, DELAYED RELEASE ORAL
Qty: 30 TABLET | Refills: 0 | Status: SHIPPED | OUTPATIENT
Start: 2021-08-20 | End: 2021-10-06

## 2021-09-21 PROBLEM — E78.5 HYPERLIPIDEMIA: Chronic | Status: ACTIVE | Noted: 2021-09-21

## 2021-10-06 ENCOUNTER — OFFICE VISIT (OUTPATIENT)
Dept: CARDIOLOGY | Facility: CLINIC | Age: 56
End: 2021-10-06

## 2021-10-06 VITALS
WEIGHT: 106 LBS | HEIGHT: 59 IN | SYSTOLIC BLOOD PRESSURE: 84 MMHG | DIASTOLIC BLOOD PRESSURE: 50 MMHG | BODY MASS INDEX: 21.37 KG/M2 | HEART RATE: 58 BPM

## 2021-10-06 DIAGNOSIS — I95.2 HYPOTENSION DUE TO DRUGS: ICD-10-CM

## 2021-10-06 DIAGNOSIS — E78.5 HYPERLIPIDEMIA, UNSPECIFIED HYPERLIPIDEMIA TYPE: Primary | ICD-10-CM

## 2021-10-06 DIAGNOSIS — R07.89 OTHER CHEST PAIN: ICD-10-CM

## 2021-10-06 PROCEDURE — 99213 OFFICE O/P EST LOW 20 MIN: CPT | Performed by: NURSE PRACTITIONER

## 2021-10-06 RX ORDER — ALPRAZOLAM 1 MG/1
0.5 TABLET ORAL 2 TIMES DAILY PRN
COMMUNITY
Start: 2021-09-20

## 2021-10-06 NOTE — ASSESSMENT & PLAN NOTE
History of mild hyperlipidemia.  Coronary calcium channel score was 0.  Encouraged to do a low-fat cholesterol diet.  We will repeat lipid labs 9 months and if cholesterol is worse will add a statin.

## 2021-10-06 NOTE — ASSESSMENT & PLAN NOTE
Hypotensive since starting metoprolol.  But she is asymptomatic for the hypotension and metoprolol is helping her chest discomfort.  So continue metoprolol.  Encouraged to increase p.o. fluid and salt intake to increase blood pressure

## 2021-10-06 NOTE — PROGRESS NOTES
Chief Complaint  Hyperlipidemia    Subjective            Lisa To presents to [facility]  Is a 55-year-old white female who states that since she started metoprolol she is not had any chest pain in her left flank area.  Has occasional palpitations a fluttery sensation but it is mild and unchanged.Denies shortness of breath, swelling, dizziness, syncope, PND, and orthopnea.  States her blood pressures are always low and she denies having any dizziness or fatigue with the metoprolol.  She did do the calcium channel score as directed.  She has not had a Covid vaccine.              Past History:    Past Medical History:   Diagnosis Date   • Allergic rhinitis    • Anxiety    • Hyperlipidemia 2021   • Pain, lumbar region         Family History: family history includes Cancer in an other family member; Colon cancer in an other family member; Diabetes in an other family member; Heart disease in an other family member.     Social History: reports that she has never smoked. She has never used smokeless tobacco. She reports previous alcohol use. She reports that she does not use drugs.    Allergies: Morphine and Sulfa antibiotics      Past Surgical History:   Procedure Laterality Date   •  SECTION     • COLONOSCOPY  2017    Dr. Clarke   • HYSTERECTOMY          Prior to Admission medications    Medication Sig Start Date End Date Taking? Authorizing Provider   estradiol (ESTRACE) 1 MG tablet estradiol 1 mg oral tablet take 1 tablet (1 mg) by oral route once daily   Active   Yes Provider, MD Marina   loratadine (Claritin) 10 MG tablet Claritin 10 mg oral tablet take 1 tablet (10 mg) by oral route once daily   Active   Yes ProviderMarina MD   metoprolol succinate XL (TOPROL-XL) 25 MG 24 hr tablet Take 25 mg by mouth. 0.5 tab qd 21  Yes Provider, MD Marina   ALPRAZolam (XANAX) 1 MG tablet  21   Provider, MD Marina   diazePAM (VALIUM) 5 MG  "tablet Take 2.5-5 mg by mouth 2 (Two) Times a Day As Needed. 5/4/21   Provider, MD Marina   HYDROcodone-acetaminophen (NORCO)  MG per tablet  6/18/21   ProviderMarina MD   pantoprazole (PROTONIX) 40 MG EC tablet TAKE 1 TABLET BY MOUTH ONCE A DAY 8/20/21   Zoila Steele, BELEN        Review of Systems   Respiratory: Negative for shortness of breath.    Cardiovascular: Positive for palpitations (occassional fluttering). Negative for chest pain and leg swelling.   Neurological: Negative for light-headedness.   Psychiatric/Behavioral: The patient is nervous/anxious.    All other systems reviewed and are negative.       Objective     Physical Exam  Constitutional:       General: She is not in acute distress.     Appearance: Normal appearance. She is normal weight.   Neck:      Vascular: No carotid bruit.   Cardiovascular:      Rate and Rhythm: Normal rate and regular rhythm.      Heart sounds: Normal heart sounds.   Pulmonary:      Effort: Pulmonary effort is normal.      Breath sounds: Normal breath sounds.   Musculoskeletal:      Right lower leg: No edema.      Left lower leg: No edema.   Neurological:      Mental Status: She is alert.       BP (!) 84/50   Pulse 58   Ht 149.9 cm (59\")   Wt 48.1 kg (106 lb)   BMI 21.41 kg/m²       Vitals:    10/06/21 1347   BP: (!) 84/50   Pulse: 58       Result Review :         The following data was reviewed by: BELEN Mock on 10/06/2021:      Lab Results   Component Value Date    PROBNP 281.8 06/29/2021     CMP    CMP 12/3/20 6/29/21   Glucose  104 (A)   Glucose 82    BUN 11 12   Creatinine 0.89 1.00   eGFR Non African Am  58 (A)   Sodium 135 138   Potassium 3.8 4.2   Chloride 98 (A) 97 (A)   Calcium 10.2 9.4   Albumin 4.5 4.70   Total Bilirubin 0.17 (A) 0.2   Alkaline Phosphatase 59 52   AST (SGOT) 32 17   ALT (SGPT) 13 10   (A) Abnormal value            CBC w/diff    CBC w/Diff 12/3/20 6/29/21   WBC 6.09 6.13   RBC 4.04 (A) 3.67 (A) "   Hemoglobin 12.4 11.3 (A)   Hematocrit 37.2 33.3 (A)   MCV 92.1 90.7   MCH 30.7 30.8   MCHC 33.3 33.9   RDW 12.5 12.0 (A)   Platelets 374 351   Neutrophil Rel % 59.2 61.6   Immature Granulocyte Rel %  0.2   Lymphocyte Rel % 33.5 27.1   Monocyte Rel % 5.6 6.7   Eosinophil Rel % 0.7 3.6   Basophil Rel % 0.7 0.8   (A) Abnormal value             Lipid Panel    Lipid Panel 12/3/20   Total Cholesterol 239 (A)   Triglycerides 179 (A)   HDL Cholesterol 55   VLDL Cholesterol 36   LDL Cholesterol  148 (A)   (A) Abnormal value       Comments are available for some flowsheets but are not being displayed.            Lab Results   Component Value Date    TSH 0.924 12/03/2020    TSH 0.847 05/31/2019      Lab Results   Component Value Date    FREET4 1.1 12/03/2020      No results found for: DDIMERQUANT  Magnesium   Date Value Ref Range Status   06/29/2021 1.9 1.6 - 2.6 mg/dL Final      No results found for: DIGOXIN                          Assessment and Plan        Diagnoses and all orders for this visit:    1. Hyperlipidemia, unspecified hyperlipidemia type (Primary)  Assessment & Plan:  History of mild hyperlipidemia.  Coronary calcium channel score was 0.  Encouraged to do a low-fat cholesterol diet.  We will repeat lipid labs 9 months and if cholesterol is worse will add a statin.    Orders:  -     Lipid Panel; Future  -     Comprehensive Metabolic Panel; Future    2. Other chest pain  Comments:  No further chest pain on metoprolol.  Continue metoprolol 25 mg at night    3. Hypotension due to drugs  Assessment & Plan:  Hypotensive since starting metoprolol.  But she is asymptomatic for the hypotension and metoprolol is helping her chest discomfort.  So continue metoprolol.  Encouraged to increase p.o. fluid and salt intake to increase blood pressure      3.  Encouraged her to get a Covid vaccine.        Follow Up     Return in about 9 months (around 7/6/2022) for with Dr. Brady.    Patient was given instructions and  counseling regarding her condition or for health maintenance advice. Please see specific information pulled into the AVS if appropriate.       BELEN Henderson  10/06/21 13:52 EDT

## 2021-10-27 NOTE — TELEPHONE ENCOUNTER
Rx Refill Note  Requested Prescriptions     Pending Prescriptions Disp Refills   • estradiol (ESTRACE) 1 MG tablet [Pharmacy Med Name: ESTRADIOL 1 MG TABLET] 90 tablet      Sig: TAKE ONE TABLET BY MOUTH DAILY      Last office visit with prescribing clinician:PATIENT LAST SEEN ON 6-2-20/ RX GIVEN ON 6-2-20 ESTRATEST #30 WITH 5 REFILLS / REFILL GIVEN ON 7-13-20 #902 WITH 4 REFILLS     Next office visit with prescribing clinician: NO APPOINTMENT SCHEDULED         Sara Adler MA  10/27/21, 16:41 EDT

## 2021-10-28 RX ORDER — ESTRADIOL 1 MG/1
TABLET ORAL
Qty: 90 TABLET | Refills: 0 | Status: SHIPPED | OUTPATIENT
Start: 2021-10-28 | End: 2022-01-17

## 2021-11-30 ENCOUNTER — TELEPHONE (OUTPATIENT)
Dept: UROLOGY | Facility: CLINIC | Age: 56
End: 2021-11-30

## 2021-12-17 RX ORDER — METOPROLOL SUCCINATE 25 MG/1
TABLET, EXTENDED RELEASE ORAL
Qty: 90 TABLET | Refills: 1 | Status: ON HOLD | OUTPATIENT
Start: 2021-12-17 | End: 2023-02-01

## 2021-12-21 RX ORDER — PANTOPRAZOLE SODIUM 40 MG/1
TABLET, DELAYED RELEASE ORAL
Qty: 30 TABLET | OUTPATIENT
Start: 2021-12-21

## 2022-01-14 ENCOUNTER — TELEPHONE (OUTPATIENT)
Dept: OBSTETRICS AND GYNECOLOGY | Facility: CLINIC | Age: 57
End: 2022-01-14

## 2022-01-14 NOTE — TELEPHONE ENCOUNTER
Pt called stating that she would like to go back to taking estratest. Pt was given rx on 06/02/20 for estratest by DR. LUCERO. But was changed on 07/13/20 estradiol 1 mg, Pt does not think this is working anymore and wants to go back to estratest. Please advise

## 2022-01-17 DIAGNOSIS — N95.1 MENOPAUSE SYNDROME: Primary | ICD-10-CM

## 2022-01-17 RX ORDER — ESTERIFIED ESTROGEN AND METHYLTESTOSTERONE 1.25; 2.5 MG/1; MG/1
1 TABLET ORAL DAILY
Qty: 30 TABLET | Refills: 5 | Status: SHIPPED | OUTPATIENT
Start: 2022-01-17 | End: 2022-04-11

## 2022-01-18 ENCOUNTER — TELEPHONE (OUTPATIENT)
Dept: OBSTETRICS AND GYNECOLOGY | Facility: CLINIC | Age: 57
End: 2022-01-18

## 2022-01-19 ENCOUNTER — TELEPHONE (OUTPATIENT)
Dept: OBSTETRICS AND GYNECOLOGY | Facility: CLINIC | Age: 57
End: 2022-01-19

## 2022-01-19 NOTE — TELEPHONE ENCOUNTER
Returned [facility] call.  Pt requesting Rx for Estratest be sent to [facility].  Rx sent to [facility] on 1-17-22/  Called rx Estratest #30 with 5 refills.

## 2022-01-31 ENCOUNTER — OFFICE VISIT (OUTPATIENT)
Dept: UROLOGY | Facility: CLINIC | Age: 57
End: 2022-01-31

## 2022-01-31 VITALS
HEIGHT: 59 IN | SYSTOLIC BLOOD PRESSURE: 111 MMHG | WEIGHT: 107.2 LBS | BODY MASS INDEX: 21.61 KG/M2 | DIASTOLIC BLOOD PRESSURE: 60 MMHG

## 2022-01-31 DIAGNOSIS — N32.89 BLADDER IRRITATION: Primary | ICD-10-CM

## 2022-01-31 PROBLEM — N32.81 OAB (OVERACTIVE BLADDER): Status: ACTIVE | Noted: 2022-01-31

## 2022-01-31 LAB
BILIRUB BLD-MCNC: NEGATIVE MG/DL
CLARITY, POC: CLEAR
COLOR UR: NORMAL
EXPIRATION DATE: NORMAL
GLUCOSE UR STRIP-MCNC: NEGATIVE MG/DL
KETONES UR QL: NEGATIVE
LEUKOCYTE EST, POC: NEGATIVE
Lab: NORMAL
NITRITE UR-MCNC: NEGATIVE MG/ML
PH UR: 6 [PH] (ref 5–8)
PROT UR STRIP-MCNC: NEGATIVE MG/DL
RBC # UR STRIP: NEGATIVE /UL
SP GR UR: 1.02 (ref 1–1.03)
UROBILINOGEN UR QL: NORMAL

## 2022-01-31 PROCEDURE — 99202 OFFICE O/P NEW SF 15 MIN: CPT | Performed by: UROLOGY

## 2022-01-31 PROCEDURE — 81003 URINALYSIS AUTO W/O SCOPE: CPT | Performed by: UROLOGY

## 2022-01-31 RX ORDER — ONDANSETRON 4 MG/1
1 TABLET, FILM COATED ORAL AS NEEDED
Status: ON HOLD | COMMUNITY
Start: 2021-11-08 | End: 2023-02-01

## 2022-02-01 NOTE — ASSESSMENT & PLAN NOTE
- I reassured her that she does not have bladder cancer or kidney cancer. Both of her last two cultures were negative for blood.  - CT scan of the abdomen in 06/2021 was unremarkable.  - She will follow up as needed.

## 2022-02-01 NOTE — PROGRESS NOTES
"Chief Complaint  Establish Care ([facility])    Subjective          Lisa To presents to [facility] UROLOGY  History of Present Illness    The patient consents to being recorded using BIN.    Lisa To is a 56-year-old female who presents today for a follow-up.    The patient reports feeling well. She was scheduled to be seen earlier but had to postpone due to COVID-19. 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 She also reports having sciatica nerve illness. The patient reports sitting for prolonged hours during her job in the last 19 years.     The patient reports seeing a pain management physician; however, she has stopped taking pain medication prior to 11/2021.    The patient reports that she is unable to completely empty her bladder; although, she feels like she has a full bladder. She reports that she takes AZO for relief. Of note, she is status post hysterectomy.     The patient reports that she had an endoscopy and revealed that she has gastric ulcers from drinking soda. She expresses concern for cancer due to her taking Zantac to relieve her acid reflux. She reports having a decreased appetite.      Objective   Vital Signs:   /60   Ht 149.9 cm (59\")   Wt 48.6 kg (107 lb 3.2 oz)   BMI 21.65 kg/m²     Physical Exam  Vitals and nursing note reviewed.   Constitutional:       Appearance: Normal appearance. She is well-developed.   Pulmonary:      Effort: Pulmonary effort is normal.      Breath sounds: Normal air entry.   Neurological:      Mental Status: She is alert and oriented to person, place, and time.      Motor: Motor function is intact.   Psychiatric:         Mood and Affect: Mood normal.         Behavior: Behavior normal.        Result Review :                  Results for orders placed or performed in visit on 01/31/22   POC Urinalysis Dipstick, Automated    Specimen: Urine   Result Value Ref Range    Color Sruthi Yellow, Straw, Dark " Yellow, Sruthi    Clarity, UA Clear Clear    Specific Gravity  1.020 1.005 - 1.030    pH, Urine 6.0 5.0 - 8.0    Leukocytes Negative Negative    Nitrite, UA Negative Negative    Protein, POC Negative Negative mg/dL    Glucose, UA Negative Negative, 1000 mg/dL (3+) mg/dL    Ketones, UA Negative Negative    Urobilinogen, UA Normal Normal    Bilirubin Negative Negative    Blood, UA Negative Negative    Lot Number [id number]     Expiration Date 12/31/22        Assessment and Plan    Diagnoses and all orders for this visit:    1. Bladder irritation (Primary)  Assessment & Plan:  - I reassured her that she does not have bladder cancer or kidney cancer. Both of her last two cultures were negative for blood.  - CT scan of the abdomen in 06/2021 was unremarkable.  - She will follow up as needed.    Orders:  -     POC Urinalysis Dipstick, Automated        Follow Up   Return in about 1 year (around 1/31/2023) for Annual Visit.  Patient was given instructions and counseling regarding her condition or for health maintenance advice. Please see specific information pulled into the AVS if appropriate.     Transcribed from ambient dictation for Leeanne Byrnes MD by Shaye Mitchell.   02/01/22   11:29 EST    Patient verbalized consent to the visit recording.  I have personally performed the services described in this document as transcribed by the above individual, and it is both accurate and complete.  Leeanne Byrnes MD  2/2/2022  12:16 EST

## 2022-02-09 ENCOUNTER — TELEPHONE (OUTPATIENT)
Dept: SURGERY | Facility: CLINIC | Age: 57
End: 2022-02-09

## 2022-02-22 ENCOUNTER — TELEPHONE (OUTPATIENT)
Dept: SURGERY | Facility: CLINIC | Age: 57
End: 2022-02-22

## 2022-02-22 NOTE — TELEPHONE ENCOUNTER
CALLED PT IN REGARDS TO OUR OFFICE NEEDING HER NEW POLICY NUMBER FOR HER  INSURANCE COVERAGE. PT WAS SUPPOSED TO CALL THE OFFICE ON THE 10TH PER HER CONVERSATION WITH ANOTHER STAFF MEMBER-AZAEL TO GIVE US THE UPDATED POLICY NUMBER THAT CHANGED. PT HAS NOT CALLED TO UPDATE US AND  THERE WAS NO ANSWER WHEN I CALLED TODAY.

## 2022-03-04 ENCOUNTER — HOSPITAL ENCOUNTER (EMERGENCY)
Facility: HOSPITAL | Age: 57
Discharge: HOME | End: 2022-03-04
Attending: EMERGENCY MEDICINE | Admitting: EMERGENCY MEDICINE

## 2022-03-04 ENCOUNTER — APPOINTMENT (OUTPATIENT)
Dept: GENERAL RADIOLOGY | Facility: HOSPITAL | Age: 57
End: 2022-03-04

## 2022-03-04 VITALS
TEMPERATURE: 98.4 F | BODY MASS INDEX: 20.8 KG/M2 | DIASTOLIC BLOOD PRESSURE: 100 MMHG | WEIGHT: 103.17 LBS | SYSTOLIC BLOOD PRESSURE: 134 MMHG | HEIGHT: 59 IN | HEART RATE: 87 BPM | OXYGEN SATURATION: 99 % | RESPIRATION RATE: 18 BRPM

## 2022-03-04 DIAGNOSIS — E86.0 DEHYDRATION: ICD-10-CM

## 2022-03-04 DIAGNOSIS — R53.1 WEAKNESS: Primary | ICD-10-CM

## 2022-03-04 LAB
ALBUMIN SERPL-MCNC: 4.9 G/DL (ref 3.5–5.2)
ALBUMIN/GLOB SERPL: 1.6 G/DL
ALP SERPL-CCNC: 61 U/L (ref 39–117)
ALT SERPL W P-5'-P-CCNC: 16 U/L (ref 1–33)
ANION GAP SERPL CALCULATED.3IONS-SCNC: 20.2 MMOL/L (ref 5–15)
AST SERPL-CCNC: 37 U/L (ref 1–32)
BASOPHILS # BLD AUTO: 0.03 10*3/MM3 (ref 0–0.2)
BASOPHILS NFR BLD AUTO: 0.4 % (ref 0–1.5)
BILIRUB SERPL-MCNC: 0.6 MG/DL (ref 0–1.2)
BUN SERPL-MCNC: 11 MG/DL (ref 6–20)
BUN/CREAT SERPL: 9.4 (ref 7–25)
CALCIUM SPEC-SCNC: 9.8 MG/DL (ref 8.6–10.5)
CHLORIDE SERPL-SCNC: 93 MMOL/L (ref 98–107)
CO2 SERPL-SCNC: 17.8 MMOL/L (ref 22–29)
CREAT SERPL-MCNC: 1.17 MG/DL (ref 0.57–1)
DEPRECATED RDW RBC AUTO: 39.9 FL (ref 37–54)
EGFRCR SERPLBLD CKD-EPI 2021: 54.9 ML/MIN/1.73
EOSINOPHIL # BLD AUTO: 0.07 10*3/MM3 (ref 0–0.4)
EOSINOPHIL NFR BLD AUTO: 0.9 % (ref 0.3–6.2)
ERYTHROCYTE [DISTWIDTH] IN BLOOD BY AUTOMATED COUNT: 12 % (ref 12.3–15.4)
GLOBULIN UR ELPH-MCNC: 3.1 GM/DL
GLUCOSE SERPL-MCNC: 117 MG/DL (ref 65–99)
HCT VFR BLD AUTO: 36.1 % (ref 34–46.6)
HGB BLD-MCNC: 12.2 G/DL (ref 12–15.9)
HOLD SPECIMEN: NORMAL
HOLD SPECIMEN: NORMAL
IMM GRANULOCYTES # BLD AUTO: 0.03 10*3/MM3 (ref 0–0.05)
IMM GRANULOCYTES NFR BLD AUTO: 0.4 % (ref 0–0.5)
LYMPHOCYTES # BLD AUTO: 1.31 10*3/MM3 (ref 0.7–3.1)
LYMPHOCYTES NFR BLD AUTO: 16.1 % (ref 19.6–45.3)
MAGNESIUM SERPL-MCNC: 1.8 MG/DL (ref 1.6–2.6)
MCH RBC QN AUTO: 30.5 PG (ref 26.6–33)
MCHC RBC AUTO-ENTMCNC: 33.8 G/DL (ref 31.5–35.7)
MCV RBC AUTO: 90.3 FL (ref 79–97)
MONOCYTES # BLD AUTO: 0.51 10*3/MM3 (ref 0.1–0.9)
MONOCYTES NFR BLD AUTO: 6.3 % (ref 5–12)
NEUTROPHILS NFR BLD AUTO: 6.18 10*3/MM3 (ref 1.7–7)
NEUTROPHILS NFR BLD AUTO: 75.9 % (ref 42.7–76)
NRBC BLD AUTO-RTO: 0 /100 WBC (ref 0–0.2)
PLATELET # BLD AUTO: 367 10*3/MM3 (ref 140–450)
PMV BLD AUTO: 9.1 FL (ref 6–12)
POTASSIUM SERPL-SCNC: 3.3 MMOL/L (ref 3.5–5.2)
PROT SERPL-MCNC: 8 G/DL (ref 6–8.5)
RBC # BLD AUTO: 4 10*6/MM3 (ref 3.77–5.28)
SODIUM SERPL-SCNC: 131 MMOL/L (ref 136–145)
TROPONIN T SERPL-MCNC: <0.01 NG/ML (ref 0–0.03)
WBC NRBC COR # BLD: 8.13 10*3/MM3 (ref 3.4–10.8)
WHOLE BLOOD HOLD SPECIMEN: NORMAL
WHOLE BLOOD HOLD SPECIMEN: NORMAL

## 2022-03-04 PROCEDURE — 85025 COMPLETE CBC W/AUTO DIFF WBC: CPT

## 2022-03-04 PROCEDURE — 99284 EMERGENCY DEPT VISIT MOD MDM: CPT

## 2022-03-04 PROCEDURE — 84484 ASSAY OF TROPONIN QUANT: CPT

## 2022-03-04 PROCEDURE — 83735 ASSAY OF MAGNESIUM: CPT

## 2022-03-04 PROCEDURE — 93005 ELECTROCARDIOGRAM TRACING: CPT | Performed by: EMERGENCY MEDICINE

## 2022-03-04 PROCEDURE — 36415 COLL VENOUS BLD VENIPUNCTURE: CPT

## 2022-03-04 PROCEDURE — 93005 ELECTROCARDIOGRAM TRACING: CPT

## 2022-03-04 PROCEDURE — 93010 ELECTROCARDIOGRAM REPORT: CPT | Performed by: INTERNAL MEDICINE

## 2022-03-04 PROCEDURE — 80053 COMPREHEN METABOLIC PANEL: CPT

## 2022-03-04 PROCEDURE — 71045 X-RAY EXAM CHEST 1 VIEW: CPT

## 2022-03-04 RX ORDER — SODIUM CHLORIDE 0.9 % (FLUSH) 0.9 %
10 SYRINGE (ML) INJECTION AS NEEDED
Status: DISCONTINUED | OUTPATIENT
Start: 2022-03-04 | End: 2022-03-05 | Stop reason: HOSPADM

## 2022-03-05 NOTE — ED PROVIDER NOTES
Time: 9:22 PM EST  Arrived by: private car  Chief Complaint: Anxiety  History provided by: Patient  History is limited by: N/A     History of Present Illness:  Patient is a 56 y.o. female that presents to the emergency department with anxiety. Pt states she went to her  today and has not been eating or drinking well due to anxiety over her divorce. When EMS arrived, pt was disoriented and had vomited. She denies current physical complaints, fever, dysuria, difficulty urinating, chills, recent trauma, CP, nausea, suicidal ideation, and homicidal ideation.       History provided by:  Patient   used: No        Similar Symptoms Previously: N/A  Recently seen: 22 by urology       Patient Care Team  Primary Care Provider: Rafita Schmidt MD    Past Medical History:     Allergies   Allergen Reactions   • Morphine Hives     Past Medical History:   Diagnosis Date   • Allergic rhinitis    • Anxiety    • Hyperlipidemia 2021   • Pain, lumbar region      Past Surgical History:   Procedure Laterality Date   •  SECTION     • COLONOSCOPY  2017    Dr. Clarke   • HYSTERECTOMY       Family History   Problem Relation Age of Onset   • Cancer Other    • Heart disease Other         Cerebrovascular disease   • Diabetes Other    • Colon cancer Other         Uncle       Home Medications:  Prior to Admission medications    Medication Sig Start Date End Date Taking? Authorizing Provider   ALPRAZolam (XANAX) 1 MG tablet  21   ProviderMarina MD   estrogens, conjugated,-methyltestosterone (EEMT,COVARYX) 1.25-2.5 MG per tablet Take 1 tablet by mouth Daily. 22   Jamee Holder MD   loratadine (Claritin) 10 MG tablet Claritin 10 mg oral tablet take 1 tablet (10 mg) by oral route once daily   Active    Provider, MD Marina   metoprolol succinate XL (TOPROL-XL) 25 MG 24 hr tablet TAKE ONE-HALF TABLET BY MOUTH EVERY NIGHT AT BEDTIME 21   Lilian Fung , APRN  "  ondansetron (ZOFRAN) 4 MG tablet Take 1 tablet by mouth As Needed. 11/8/21   ProviderMarina MD   Phenazopyridine HCl (AZO URINARY PAIN PO) Take 1 tablet by mouth 2 (Two) Times a Day As Needed.    Provider, MD Marina        Social History:   Social History     Tobacco Use   • Smoking status: Never Smoker   • Smokeless tobacco: Never Used   Vaping Use   • Vaping Use: Never used   Substance Use Topics   • Alcohol use: Not Currently   • Drug use: Never     Recent travel: not applicable     Review of Systems:  Review of Systems   Constitutional: Negative for chills and fever.   Cardiovascular: Negative for chest pain.   Gastrointestinal: Negative for nausea.   Genitourinary: Negative for difficulty urinating and dyspareunia.   Musculoskeletal: Negative for myalgias.   Psychiatric/Behavioral: Negative for suicidal ideas.        Positive for anxiety. Negative for homicidal ideas.        Physical Exam:  /78   Pulse 97   Temp 98.4 °F (36.9 °C)   Resp 18   Ht 149.9 cm (59\")   Wt 46.8 kg (103 lb 2.8 oz)   SpO2 99%   Breastfeeding No   BMI 20.84 kg/m²     Physical Exam  HENT:      Mouth/Throat:      Lips: Pink.      Mouth: Mucous membranes are moist.   Eyes:      Pupils: Pupils are equal, round, and reactive to light.   Cardiovascular:      Rate and Rhythm: Normal rate and regular rhythm.      Heart sounds: Normal heart sounds.   Pulmonary:      Effort: Pulmonary effort is normal. No respiratory distress.      Breath sounds: Normal breath sounds.   Abdominal:      General: Bowel sounds are normal.      Palpations: Abdomen is soft.      Tenderness: There is no abdominal tenderness.   Musculoskeletal:      Cervical back: Neck supple.   Neurological:      Mental Status: She is alert and oriented to person, place, and time.      Cranial Nerves: No cranial nerve deficit.   Psychiatric:         Mood and Affect: Mood is anxious.                Medications in the Emergency Department:  Medications   sodium " chloride 0.9 % flush 10 mL (has no administration in time range)        Labs  Lab Results (last 24 hours)     Procedure Component Value Units Date/Time    CBC & Differential [[id number]]  (Abnormal) Collected: 03/04/22 1700    Specimen: Blood Updated: 03/04/22 1718    Narrative:      The following orders were created for panel order CBC & Differential.  Procedure                               Abnormality         Status                     ---------                               -----------         ------                     CBC Auto Differential[[id number]]        Abnormal            Final result                 Please view results for these tests on the individual orders.    Comprehensive Metabolic Panel [[id number]]  (Abnormal) Collected: 03/04/22 1700    Specimen: Blood Updated: 03/04/22 1740     Glucose 117 mg/dL      BUN 11 mg/dL      Creatinine 1.17 mg/dL      Sodium 131 mmol/L      Potassium 3.3 mmol/L      Chloride 93 mmol/L      CO2 17.8 mmol/L      Calcium 9.8 mg/dL      Total Protein 8.0 g/dL      Albumin 4.90 g/dL      ALT (SGPT) 16 U/L      AST (SGOT) 37 U/L      Alkaline Phosphatase 61 U/L      Total Bilirubin 0.6 mg/dL      Globulin 3.1 gm/dL      A/G Ratio 1.6 g/dL      BUN/Creatinine Ratio 9.4     Anion Gap 20.2 mmol/L      eGFR 54.9 mL/min/1.73      Comment: National Kidney Foundation and American Society of Nephrology (ASN) Task Force recommended calculation based on the Chronic Kidney Disease Epidemiology Collaboration (CKD-EPI) equation refit without adjustment for race.       Narrative:      GFR Normal >60  Chronic Kidney Disease <60  Kidney Failure <15      Troponin [[id number]]  (Normal) Collected: 03/04/22 1700    Specimen: Blood Updated: 03/04/22 1740     Troponin T <0.010 ng/mL     Narrative:      Troponin T Reference Range:  <= 0.03 ng/mL-   Negative for AMI  >0.03 ng/mL-     Abnormal for myocardial necrosis.  Clinicians would have to utilize clinical acumen, EKG, Troponin and serial changes  to determine if it is an Acute Myocardial Infarction or myocardial injury due to an underlying chronic condition.       Results may be falsely decreased if patient taking Biotin.      Magnesium [[id number]]  (Normal) Collected: 03/04/22 1700    Specimen: Blood Updated: 03/04/22 1740     Magnesium 1.8 mg/dL     CBC Auto Differential [[id number]]  (Abnormal) Collected: 03/04/22 1700    Specimen: Blood Updated: 03/04/22 1718     WBC 8.13 10*3/mm3      RBC 4.00 10*6/mm3      Hemoglobin 12.2 g/dL      Hematocrit 36.1 %      MCV 90.3 fL      MCH 30.5 pg      MCHC 33.8 g/dL      RDW 12.0 %      RDW-SD 39.9 fl      MPV 9.1 fL      Platelets 367 10*3/mm3      Neutrophil % 75.9 %      Lymphocyte % 16.1 %      Monocyte % 6.3 %      Eosinophil % 0.9 %      Basophil % 0.4 %      Immature Grans % 0.4 %      Neutrophils, Absolute 6.18 10*3/mm3      Lymphocytes, Absolute 1.31 10*3/mm3      Monocytes, Absolute 0.51 10*3/mm3      Eosinophils, Absolute 0.07 10*3/mm3      Basophils, Absolute 0.03 10*3/mm3      Immature Grans, Absolute 0.03 10*3/mm3      nRBC 0.0 /100 WBC            Imaging:  XR Chest 1 View    Result Date: 3/4/2022  PROCEDURE: XR CHEST 1 VW  COMPARISON: [facility], , XR CHEST 1 VW, 6/29/2021, 23:10.  INDICATIONS: AMS SINCE TODAY  FINDINGS:  The lungs are adequately expanded. There is no focal consolidation, pneumothorax, or obvious pleural effusion. The pulmonary vasculature appears within normal limits. The cardiac and mediastinal contours are within normal limits. The osseous structures appear intact.        No acute cardiopulmonary process.        SUDHIR BARBOSA MD       Electronically Signed and Approved By: SUDHIR BARBOSA MD on 3/04/2022 at 17:30               Procedures:  Procedures    Progress                            Medical Decision Making:  [facility]     Final diagnoses:   None        Disposition:  ED Disposition     None          Documentation assistance provided by Jareth Abad acting as scribe for  Dr. Ashish Murphy MD. Information recorded by the scribe was done at my direction and has been verified and validated by me.          Jareth Abad  03/04/22 2132       Ashish Murphy DO  03/06/22 0577

## 2022-03-05 NOTE — DISCHARGE INSTRUCTIONS
Drink plenty of fluids.  Take medication as directed.  Turn for worsening symptoms.  Follow-up with your doctor in 2 days if no better.

## 2022-03-06 LAB — QT INTERVAL: 379 MS

## 2022-03-06 NOTE — ED PROVIDER NOTES
Subjective   History of Present Illness    Review of Systems    Past Medical History:   Diagnosis Date   • Allergic rhinitis    • Anxiety    • Hyperlipidemia 2021   • Pain, lumbar region        Allergies   Allergen Reactions   • Morphine Hives       Past Surgical History:   Procedure Laterality Date   •  SECTION     • COLONOSCOPY  2017    Dr. Clarke   • HYSTERECTOMY         Family History   Problem Relation Age of Onset   • Cancer Other    • Heart disease Other         Cerebrovascular disease   • Diabetes Other    • Colon cancer Other         Uncle       Social History     Socioeconomic History   • Marital status:    Tobacco Use   • Smoking status: Never Smoker   • Smokeless tobacco: Never Used   Vaping Use   • Vaping Use: Never used   Substance and Sexual Activity   • Alcohol use: Not Currently   • Drug use: Never   • Sexual activity: Defer           Objective   Physical Exam    Procedures           ED Course    Labs Reviewed   COMPREHENSIVE METABOLIC PANEL - Abnormal; Notable for the following components:       Result Value    Glucose 117 (*)     Creatinine 1.17 (*)     Sodium 131 (*)     Potassium 3.3 (*)     Chloride 93 (*)     CO2 17.8 (*)     AST (SGOT) 37 (*)     Anion Gap 20.2 (*)     eGFR 54.9 (*)     All other components within normal limits    Narrative:     GFR Normal >60  Chronic Kidney Disease <60  Kidney Failure <15     CBC WITH AUTO DIFFERENTIAL - Abnormal; Notable for the following components:    RDW 12.0 (*)     Lymphocyte % 16.1 (*)     All other components within normal limits   TROPONIN (IN-HOUSE) - Normal    Narrative:     Troponin T Reference Range:  <= 0.03 ng/mL-   Negative for AMI  >0.03 ng/mL-     Abnormal for myocardial necrosis.  Clinicians would have to utilize clinical acumen, EKG, Troponin and serial changes to determine if it is an Acute Myocardial Infarction or myocardial injury due to an underlying chronic condition.       Results may be falsely decreased  if patient taking Biotin.     MAGNESIUM - Normal   RAINBOW DRAW    Narrative:     The following orders were created for panel order [place] Draw.  Procedure                               Abnormality         Status                     ---------                               -----------         ------                     Green Top (Gel)[[id number]]                                  Final result               Lavender Top[[id number]]                                     Final result               Gold Top - SST[[id number]]                                   Final result               Light Blue Top[[id number]]                                   Final result                 Please view results for these tests on the individual orders.   CBC AND DIFFERENTIAL    Narrative:     The following orders were created for panel order CBC & Differential.  Procedure                               Abnormality         Status                     ---------                               -----------         ------                     CBC Auto Differential[[id number]]        Abnormal            Final result                 Please view results for these tests on the individual orders.   GREEN TOP   LAVENDER TOP   GOLD TOP - SST   LIGHT BLUE TOP           XR Chest 1 View    Result Date: 3/4/2022  Narrative: PROCEDURE: XR CHEST 1 VW  COMPARISON: [facility], CR, XR CHEST 1 VW, 6/29/2021, 23:10.  INDICATIONS: AMS SINCE TODAY  FINDINGS:  The lungs are adequately expanded. There is no focal consolidation, pneumothorax, or obvious pleural effusion. The pulmonary vasculature appears within normal limits. The cardiac and mediastinal contours are within normal limits. The osseous structures appear intact.      Impression:   No acute cardiopulmonary process.        SUDHIR BARBOSA MD       Electronically Signed and Approved By: SUDHIR BARBOSA MD on 3/04/2022 at 17:30                                                        MDM  Number of Diagnoses or  Management Options  Dehydration  Weakness  Diagnosis management comments: The patient is alert and oriented x3 and she is competent to make medical decisions.  The patient was informed that it was felt that she had some abnormal behavior when she was initially found.  The patient currently is alert and oriented x3 and competent and she denies any such unusual signs or symptoms.  The patient states that she simply was very anxious and has not been eating or drinking well and getting dehydrated.  The patient refuses any further work-up or admission to the hospital.       Amount and/or Complexity of Data Reviewed  Clinical lab tests: reviewed  Tests in the radiology section of CPT®: reviewed  Tests in the medicine section of CPT®: reviewed  Decide to obtain previous medical records or to obtain history from someone other than the patient: yes  Obtain history from someone other than the patient: yes  Review and summarize past medical records: yes  Independent visualization of images, tracings, or specimens: yes        Final diagnoses:   Weakness   Dehydration       ED Disposition  ED Disposition     ED Disposition   Discharge    Condition   Stable    Comment   --             Rafita Schmidt MD  [address] [place] [id number] [phone]    In 3 days           Medication List      No changes were made to your prescriptions during this visit.          Ashish Murphy,   03/06/22 0536       Ashish Murphy,   03/06/22 0537       Ashish Murphy,   03/06/22 0538

## 2022-04-05 DIAGNOSIS — N95.1 MENOPAUSE SYNDROME: Primary | ICD-10-CM

## 2022-04-05 NOTE — TELEPHONE ENCOUNTER
Patient called she is requesting to change her meds from Estratest back to Estradiol 1mg.  She states Estratest too expensive.No appointment scheduled.  Her preferred pharmacy is [facility].  I have attached an order.

## 2022-04-11 RX ORDER — ESTRADIOL 1 MG/1
1 TABLET ORAL DAILY
Qty: 90 TABLET | Refills: 3 | Status: SHIPPED | OUTPATIENT
Start: 2022-04-11

## 2022-10-22 ENCOUNTER — HOSPITAL ENCOUNTER (EMERGENCY)
Facility: HOSPITAL | Age: 57
Discharge: HOME | End: 2022-10-23
Attending: EMERGENCY MEDICINE | Admitting: EMERGENCY MEDICINE

## 2022-10-22 ENCOUNTER — APPOINTMENT (OUTPATIENT)
Dept: CT IMAGING | Facility: HOSPITAL | Age: 57
End: 2022-10-22

## 2022-10-22 DIAGNOSIS — R41.82 ALTERED MENTAL STATUS, UNSPECIFIED ALTERED MENTAL STATUS TYPE: Primary | ICD-10-CM

## 2022-10-22 LAB
ALBUMIN SERPL-MCNC: 4.4 G/DL (ref 3.5–5.2)
ALBUMIN/GLOB SERPL: 1.3 G/DL
ALP SERPL-CCNC: 47 U/L (ref 39–117)
ALT SERPL W P-5'-P-CCNC: 18 U/L (ref 1–33)
AMPHET+METHAMPHET UR QL: POSITIVE
ANION GAP SERPL CALCULATED.3IONS-SCNC: 13.8 MMOL/L (ref 5–15)
APAP SERPL-MCNC: <5 MCG/ML (ref 0–30)
AST SERPL-CCNC: 27 U/L (ref 1–32)
BACTERIA UR QL AUTO: ABNORMAL /HPF
BARBITURATES UR QL SCN: NEGATIVE
BASOPHILS # BLD AUTO: 0.05 10*3/MM3 (ref 0–0.2)
BASOPHILS NFR BLD AUTO: 0.6 % (ref 0–1.5)
BENZODIAZ UR QL SCN: POSITIVE
BILIRUB SERPL-MCNC: 0.4 MG/DL (ref 0–1.2)
BILIRUB UR QL STRIP: NEGATIVE
BUN SERPL-MCNC: 18 MG/DL (ref 6–20)
BUN/CREAT SERPL: 20.9 (ref 7–25)
CALCIUM SPEC-SCNC: 9.6 MG/DL (ref 8.6–10.5)
CANNABINOIDS SERPL QL: NEGATIVE
CHLORIDE SERPL-SCNC: 98 MMOL/L (ref 98–107)
CLARITY UR: CLEAR
CO2 SERPL-SCNC: 25.2 MMOL/L (ref 22–29)
COCAINE UR QL: NEGATIVE
COLOR UR: ABNORMAL
CREAT SERPL-MCNC: 0.86 MG/DL (ref 0.57–1)
DEPRECATED RDW RBC AUTO: 39 FL (ref 37–54)
EGFRCR SERPLBLD CKD-EPI 2021: 79.4 ML/MIN/1.73
EOSINOPHIL # BLD AUTO: 0.05 10*3/MM3 (ref 0–0.4)
EOSINOPHIL NFR BLD AUTO: 0.6 % (ref 0.3–6.2)
ERYTHROCYTE [DISTWIDTH] IN BLOOD BY AUTOMATED COUNT: 11.9 % (ref 12.3–15.4)
ETHANOL BLD-MCNC: <10 MG/DL (ref 0–10)
ETHANOL UR QL: <0.01 %
GLOBULIN UR ELPH-MCNC: 3.4 GM/DL
GLUCOSE SERPL-MCNC: 114 MG/DL (ref 65–99)
GLUCOSE UR STRIP-MCNC: NEGATIVE MG/DL
HCT VFR BLD AUTO: 31.2 % (ref 34–46.6)
HGB BLD-MCNC: 11 G/DL (ref 12–15.9)
HGB UR QL STRIP.AUTO: NEGATIVE
HOLD SPECIMEN: NORMAL
HOLD SPECIMEN: NORMAL
HYALINE CASTS UR QL AUTO: ABNORMAL /LPF
IMM GRANULOCYTES # BLD AUTO: 0.01 10*3/MM3 (ref 0–0.05)
IMM GRANULOCYTES NFR BLD AUTO: 0.1 % (ref 0–0.5)
KETONES UR QL STRIP: ABNORMAL
LEUKOCYTE ESTERASE UR QL STRIP.AUTO: NEGATIVE
LYMPHOCYTES # BLD AUTO: 1.85 10*3/MM3 (ref 0.7–3.1)
LYMPHOCYTES NFR BLD AUTO: 23.5 % (ref 19.6–45.3)
MCH RBC QN AUTO: 31.8 PG (ref 26.6–33)
MCHC RBC AUTO-ENTMCNC: 35.3 G/DL (ref 31.5–35.7)
MCV RBC AUTO: 90.2 FL (ref 79–97)
METHADONE UR QL SCN: NEGATIVE
MONOCYTES # BLD AUTO: 0.76 10*3/MM3 (ref 0.1–0.9)
MONOCYTES NFR BLD AUTO: 9.7 % (ref 5–12)
MUCOUS THREADS URNS QL MICRO: ABNORMAL /HPF
NEUTROPHILS NFR BLD AUTO: 5.14 10*3/MM3 (ref 1.7–7)
NEUTROPHILS NFR BLD AUTO: 65.5 % (ref 42.7–76)
NITRITE UR QL STRIP: NEGATIVE
NRBC BLD AUTO-RTO: 0 /100 WBC (ref 0–0.2)
OPIATES UR QL: NEGATIVE
OXYCODONE UR QL SCN: NEGATIVE
PH UR STRIP.AUTO: 6 [PH] (ref 5–8)
PLATELET # BLD AUTO: 320 10*3/MM3 (ref 140–450)
PMV BLD AUTO: 9.7 FL (ref 6–12)
POTASSIUM SERPL-SCNC: 2.9 MMOL/L (ref 3.5–5.2)
PROT SERPL-MCNC: 7.8 G/DL (ref 6–8.5)
PROT UR QL STRIP: ABNORMAL
RBC # BLD AUTO: 3.46 10*6/MM3 (ref 3.77–5.28)
RBC # UR STRIP: ABNORMAL /HPF
REF LAB TEST METHOD: ABNORMAL
SALICYLATES SERPL-MCNC: <0.3 MG/DL
SODIUM SERPL-SCNC: 137 MMOL/L (ref 136–145)
SP GR UR STRIP: 1.02 (ref 1–1.03)
SQUAMOUS #/AREA URNS HPF: ABNORMAL /HPF
UROBILINOGEN UR QL STRIP: ABNORMAL
WBC # UR STRIP: ABNORMAL /HPF
WBC NRBC COR # BLD: 7.86 10*3/MM3 (ref 3.4–10.8)
WHOLE BLOOD HOLD COAG: NORMAL
WHOLE BLOOD HOLD SPECIMEN: NORMAL

## 2022-10-22 PROCEDURE — 80307 DRUG TEST PRSMV CHEM ANLYZR: CPT | Performed by: EMERGENCY MEDICINE

## 2022-10-22 PROCEDURE — 82077 ASSAY SPEC XCP UR&BREATH IA: CPT

## 2022-10-22 PROCEDURE — 96374 THER/PROPH/DIAG INJ IV PUSH: CPT

## 2022-10-22 PROCEDURE — 99285 EMERGENCY DEPT VISIT HI MDM: CPT

## 2022-10-22 PROCEDURE — 85025 COMPLETE CBC W/AUTO DIFF WBC: CPT

## 2022-10-22 PROCEDURE — 80179 DRUG ASSAY SALICYLATE: CPT

## 2022-10-22 PROCEDURE — 80053 COMPREHEN METABOLIC PANEL: CPT

## 2022-10-22 PROCEDURE — 70450 CT HEAD/BRAIN W/O DYE: CPT

## 2022-10-22 PROCEDURE — 80143 DRUG ASSAY ACETAMINOPHEN: CPT

## 2022-10-22 PROCEDURE — 81001 URINALYSIS AUTO W/SCOPE: CPT | Performed by: EMERGENCY MEDICINE

## 2022-10-22 PROCEDURE — 25010000002 LORAZEPAM PER 2 MG: Performed by: EMERGENCY MEDICINE

## 2022-10-22 PROCEDURE — 36415 COLL VENOUS BLD VENIPUNCTURE: CPT

## 2022-10-22 RX ORDER — SODIUM CHLORIDE 0.9 % (FLUSH) 0.9 %
10 SYRINGE (ML) INJECTION AS NEEDED
Status: DISCONTINUED | OUTPATIENT
Start: 2022-10-22 | End: 2022-10-23 | Stop reason: HOSPADM

## 2022-10-22 RX ORDER — LORAZEPAM 2 MG/ML
1 INJECTION INTRAMUSCULAR ONCE
Status: COMPLETED | OUTPATIENT
Start: 2022-10-22 | End: 2022-10-22

## 2022-10-22 RX ADMIN — LORAZEPAM 1 MG: 2 INJECTION INTRAMUSCULAR; INTRAVENOUS at 19:35

## 2022-10-22 NOTE — ED NOTES
PT HAD WITNESSED SEIZURE, CURRENTLY POST ICTAL AND UNRESPONSIVE. CRITICAL CARE TEAM PAGED TO BEDSIDE, MD CURRENTLY ASSESSING AT BEDSIDE
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
## 2022-10-22 NOTE — ED PROVIDER NOTES
Time: 7:36 PM EDT  Arrived by: private car  Chief Complaint: Altered Mental Status  History provided by: Sister  History is limited by: Patient Condition     History of Present Illness:  Patient is a 56 y.o. year old female who presents to the emergency department for altered mental status and a psych evaluation beginning today. Pt has been dealing with high stress lately and has been exhibiting disoriented behavior and increasingly confused. Pt also has had a seizure today. Pt's sister at bedside says that pt has not been acting like herself.       Altered Mental Status  Presenting symptoms: confusion    Severity:  Mild  Most recent episode:  Today  Episode history:  Single  Timing:  Constant  Progression:  Unchanged  Chronicity:  New  Associated symptoms: seizures    Associated symptoms: no abdominal pain, no fever, no headaches, no nausea, no palpitations and no vomiting    Psychiatric Evaluation  Associated symptoms: no abdominal pain, no chest pain, no congestion, no cough, no diarrhea, no fever, no headaches, no myalgias, no nausea, no rhinorrhea, no shortness of breath, no sore throat and no vomiting        Similar Symptoms Previously: No  Recently seen: No      Patient Care Team  Primary Care Provider: Rafita Schmidt MD    Past Medical History:     Allergies   Allergen Reactions   • Morphine Hives     Past Medical History:   Diagnosis Date   • Allergic rhinitis    • Anxiety    • Hyperlipidemia 2021   • Pain, lumbar region      Past Surgical History:   Procedure Laterality Date   •  SECTION     • COLONOSCOPY  2017    Dr. Clarke   • HYSTERECTOMY       Family History   Problem Relation Age of Onset   • Cancer Other    • Heart disease Other         Cerebrovascular disease   • Diabetes Other    • Colon cancer Other         Uncle       Home Medications:  Prior to Admission medications    Medication Sig Start Date End Date Taking? Authorizing Provider   ALPRAZolam (XANAX) 1 MG tablet  21    "ProviderMarina MD   estradiol (Estrace) 1 MG tablet Take 1 tablet by mouth Daily. 4/11/22   Jamee Holder MD   loratadine (Claritin) 10 MG tablet Claritin 10 mg oral tablet take 1 tablet (10 mg) by oral route once daily   Active    Marina Bruce MD   metoprolol succinate XL (TOPROL-XL) 25 MG 24 hr tablet TAKE ONE-HALF TABLET BY MOUTH EVERY NIGHT AT BEDTIME 12/17/21   Lilian Fung APRN   ondansetron (ZOFRAN) 4 MG tablet Take 1 tablet by mouth As Needed. 11/8/21   Marina Bruce MD   Phenazopyridine HCl (AZO URINARY PAIN PO) Take 1 tablet by mouth 2 (Two) Times a Day As Needed.    ProviderMarina MD        Social History:   Social History     Tobacco Use   • Smoking status: Never   • Smokeless tobacco: Never   Vaping Use   • Vaping Use: Never used   Substance Use Topics   • Alcohol use: Not Currently   • Drug use: Never     Recent travel: not applicable     Review of Systems:  Review of Systems   Constitutional: Negative for chills and fever.   HENT: Negative for congestion, rhinorrhea and sore throat.    Eyes: Negative for pain and visual disturbance.   Respiratory: Negative for apnea, cough, chest tightness and shortness of breath.    Cardiovascular: Negative for chest pain and palpitations.   Gastrointestinal: Negative for abdominal pain, diarrhea, nausea and vomiting.   Genitourinary: Negative for difficulty urinating and dysuria.   Musculoskeletal: Negative for joint swelling and myalgias.   Skin: Negative for color change.   Neurological: Positive for seizures. Negative for headaches.   Psychiatric/Behavioral: Positive for confusion.   All other systems reviewed and are negative.       Physical Exam:  /57 (Patient Position: Lying)   Pulse 81   Temp 98.7 °F (37.1 °C)   Resp 15   Ht 149.9 cm (59\")   Wt 39.5 kg (87 lb 1.3 oz)   SpO2 91%   BMI 17.59 kg/m²     Physical Exam  Vitals and nursing note reviewed.   Constitutional:       General: She is not in acute " distress.     Appearance: Normal appearance. She is not toxic-appearing.   HENT:      Head: Normocephalic and atraumatic.      Jaw: There is normal jaw occlusion.   Eyes:      General: Lids are normal.      Extraocular Movements: Extraocular movements intact.      Conjunctiva/sclera: Conjunctivae normal.      Pupils: Pupils are equal, round, and reactive to light.   Cardiovascular:      Rate and Rhythm: Normal rate and regular rhythm.      Pulses: Normal pulses.      Heart sounds: Normal heart sounds.   Pulmonary:      Effort: Pulmonary effort is normal. No respiratory distress.      Breath sounds: Normal breath sounds. No wheezing or rhonchi.   Abdominal:      General: Abdomen is flat.      Palpations: Abdomen is soft.      Tenderness: There is no abdominal tenderness. There is no guarding or rebound.   Musculoskeletal:         General: Normal range of motion.      Cervical back: Normal range of motion and neck supple.      Right lower leg: No edema.      Left lower leg: No edema.   Skin:     General: Skin is warm and dry.   Neurological:      Mental Status: She is alert and oriented to person, place, and time. Mental status is at baseline.      Comments: Post ictal and responsive to painful stimuli.    Psychiatric:         Mood and Affect: Mood normal.                Medications in the Emergency Department:  Medications   sodium chloride 0.9 % flush 10 mL (has no administration in time range)   LORazepam (ATIVAN) injection 1 mg (1 mg Intravenous Given 10/22/22 1935)        Labs  Lab Results (last 24 hours)     Procedure Component Value Units Date/Time    CBC & Differential [[id number]]  (Abnormal) Collected: 10/22/22 1855    Specimen: Blood Updated: 10/22/22 1905    Narrative:      The following orders were created for panel order CBC & Differential.  Procedure                               Abnormality         Status                     ---------                               -----------         ------                      CBC Auto Differential[[id number]]        Abnormal            Final result                 Please view results for these tests on the individual orders.    Comprehensive Metabolic Panel [[id number]]  (Abnormal) Collected: 10/22/22 1855    Specimen: Blood Updated: 10/22/22 1927     Glucose 114 mg/dL      BUN 18 mg/dL      Creatinine 0.86 mg/dL      Sodium 137 mmol/L      Potassium 2.9 mmol/L      Chloride 98 mmol/L      CO2 25.2 mmol/L      Calcium 9.6 mg/dL      Total Protein 7.8 g/dL      Albumin 4.40 g/dL      ALT (SGPT) 18 U/L      AST (SGOT) 27 U/L      Alkaline Phosphatase 47 U/L      Total Bilirubin 0.4 mg/dL      Globulin 3.4 gm/dL      A/G Ratio 1.3 g/dL      BUN/Creatinine Ratio 20.9     Anion Gap 13.8 mmol/L      eGFR 79.4 mL/min/1.73      Comment: National Kidney Foundation and American Society of Nephrology (ASN) Task Force recommended calculation based on the Chronic Kidney Disease Epidemiology Collaboration (CKD-EPI) equation refit without adjustment for race.       Narrative:      GFR Normal >60  Chronic Kidney Disease <60  Kidney Failure <15      Acetaminophen Level [[id number]]  (Normal) Collected: 10/22/22 1855    Specimen: Blood Updated: 10/22/22 1927     Acetaminophen <5.0 mcg/mL     Ethanol [[id number]] Collected: 10/22/22 1855    Specimen: Blood Updated: 10/22/22 1927     Ethanol <10 mg/dL      Ethanol % <0.010 %     Narrative:      Ethanol (Plasma)  <10 Essentially Negative    Toxic Concentrations           mg/dL    Flushing, slowing of reflexes    Impaired visual activity         Depression of CNS              >100  Possible Coma                  >300       Salicylate Level [[id number]]  (Normal) Collected: 10/22/22 1855    Specimen: Blood Updated: 10/22/22 1927     Salicylate <0.3 mg/dL     CBC Auto Differential [[id number]]  (Abnormal) Collected: 10/22/22 1855    Specimen: Blood Updated: 10/22/22 1905     WBC 7.86 10*3/mm3      RBC 3.46 10*6/mm3      Hemoglobin 11.0 g/dL       Hematocrit 31.2 %      MCV 90.2 fL      MCH 31.8 pg      MCHC 35.3 g/dL      RDW 11.9 %      RDW-SD 39.0 fl      MPV 9.7 fL      Platelets 320 10*3/mm3      Neutrophil % 65.5 %      Lymphocyte % 23.5 %      Monocyte % 9.7 %      Eosinophil % 0.6 %      Basophil % 0.6 %      Immature Grans % 0.1 %      Neutrophils, Absolute 5.14 10*3/mm3      Lymphocytes, Absolute 1.85 10*3/mm3      Monocytes, Absolute 0.76 10*3/mm3      Eosinophils, Absolute 0.05 10*3/mm3      Basophils, Absolute 0.05 10*3/mm3      Immature Grans, Absolute 0.01 10*3/mm3      nRBC 0.0 /100 WBC     Urine Drug Screen - Urine, Clean Catch [[id number]]  (Abnormal) Collected: 10/22/22 2039    Specimen: Urine, Clean Catch Updated: 10/22/22 2101     Amphet/Methamphet, Screen Positive     Barbiturates Screen, Urine Negative     Benzodiazepine Screen, Urine Positive     Cocaine Screen, Urine Negative     Opiate Screen Negative     THC, Screen, Urine Negative     Methadone Screen, Urine Negative     Oxycodone Screen, Urine Negative    Narrative:      Negative Thresholds Per Drugs Screened:    Amphetamines                 500 ng/ml  Barbiturates                 200 ng/ml  Benzodiazepines              100 ng/ml  Cocaine                      300 ng/ml  Methadone                    300 ng/ml  Opiates                      300 ng/ml  Oxycodone                    100 ng/ml  THC                           50 ng/ml    The Normal Value for all drugs tested is negative. This report includes final unconfirmed screening results to be used for medical treatment purposes only. Unconfirmed results must not be used for non-medical purposes such as employment or legal testing. Clinical consideration should be applied to any drug of abuse test, particularly when unconfirmed results are used.            Urinalysis With Culture If Indicated - Urine, Clean Catch [[id number]]  (Abnormal) Collected: 10/22/22 2039    Specimen: Urine, Clean Catch Updated: 10/22/22 2052     Color, UA Dark  Yellow     Appearance, UA Clear     pH, UA 6.0     Specific Gravity, UA 1.024     Glucose, UA Negative     Ketones, UA 15 mg/dL (1+)     Bilirubin, UA Negative     Blood, UA Negative     Protein, UA 30 mg/dL (1+)     Leuk Esterase, UA Negative     Nitrite, UA Negative     Urobilinogen, UA 1.0 E.U./dL    Narrative:      In absence of clinical symptoms, the presence of pyuria, bacteria, and/or nitrites on the urinalysis result does not correlate with infection.    Urinalysis, Microscopic Only - Urine, Clean Catch [[id number]]  (Abnormal) Collected: 10/22/22 2039    Specimen: Urine, Clean Catch Updated: 10/22/22 2104     RBC, UA 0-2 /HPF      WBC, UA 3-5 /HPF      Comment: Urine culture not indicated.        Bacteria, UA None Seen /HPF      Squamous Epithelial Cells, UA 0-2 /HPF      Hyaline Casts, UA 0-2 /LPF      Mucus, UA Small/1+ /HPF      Methodology Manual Light Microscopy           Imaging:  CT Head Without Contrast    Result Date: 10/22/2022  PROCEDURE: CT HEAD WO CONTRAST  COMPARISON: 11/17/2003.  INDICATIONS: HEADACHE/SEIZURE TODAY.  PROTOCOL:   Standard imaging protocol performed    RADIATION:   DLP: 954.2 mGy*cm   MA and/or KV was adjusted to minimize radiation dose.    TECHNIQUE: After obtaining the patient's consent,122 CT images were obtained without non-ionic intravenous contrast material.  DISCUSSION:    A routine nonenhanced head CT was performed.  No acute brain abnormality is identified.  No acute intracranial hemorrhage.  No acute infarct is seen.  No acute skull fracture.  No midline shift or acute intracranial mass effect is seen.  The ventricular size and configuration are within normal limits.  There may be mild cerebellar tonsillar ectopia.  There is slight motion artifact on the exam.  There is mild chronic rightward nasal septal deviation.  There may be acute contusion with laceration involving the right forehead and the superomedial right periorbital region and possibly the nasal dorsum.   Benign external auditory canal debris is suspected.        No acute brain abnormality is seen. Specifically, no acute intracranial hemorrhage, no acute infarct, no intracranial mass lesion, and no acute intracranial mass effect are appreciated.   Please note that portions of this note were completed with a voice recognition program.  JR ENGLE JR, MD       Electronically Signed and Approved By: JR ENGLE JR, MD on 10/22/2022 at 23:28                Procedures:  Procedures    Progress                            Medical Decision Making:  MDM  Number of Diagnoses or Management Options  Altered mental status, unspecified altered mental status type  Diagnosis management comments: The patient is resting comfortably and feels better, is alert, talkative and in no distress.  The patient´s CBC that was reviewed and interpreted by me shows no abnormalities of critical concern. Of note, there is no anemia requiring a blood transfusion and the platelet count is acceptable.  The patient´s CMP that was reviewed and interpretted by me shows no abnormalities of critical concern. Of note, the patient´s sodium and potassium are acceptable. The patient´s liver enzymes are unremarkable. The patient´s renal function (creatinine) is preserved. The patient has a normal anion gap.  Urinalysis is negative for bacteriuria.  Salicylates are negative.  Alcohol level is negative.  Urine drug screen is positive for amphetamines/methamphetamines and benzodiazepines.  CT scan of the head is negative for acute intracranial abnormalities.  The patient did have the cerebella placed on her and had no Dora amplitudes consistent with possible seizure-like activity.  Patient was monitored in the emergency department for 5 hours and had no more episodes.  Patient has a baseline mental status.  She has no respiratory distress, nor does she have hypoxia.  Patient stable and suitable for discharge.  The repeat examination is unremarkable and  benign. The patient is neurologically intact, has a normal mental status and this ambulatory in the ED. The history, exam, diagnostic testing in the patient's current condition do not suggest meningitis, stroke, sepsis, subarachnoid hemorrhage, intracranial bleeding, encephalitis or other significant pathology that would warrant further testing, continued ED treatment, admission, neurological consultation, or other specialist evaluation at this point. The vital signs have been stable. The patient's condition is stable and appropriate for discharge. The patient will pursue further outpatient evaluation with the primary care physician or other designated or consulting position as indicated in the discharge instructions.       Amount and/or Complexity of Data Reviewed  Clinical lab tests: reviewed  Tests in the radiology section of CPT®: reviewed  Independent visualization of images, tracings, or specimens: yes    Risk of Complications, Morbidity, and/or Mortality  Presenting problems: moderate  Management options: moderate    Patient Progress  Patient progress: stable       Final diagnoses:   Altered mental status, unspecified altered mental status type        Disposition:  ED Disposition     ED Disposition   Discharge    Condition   Stable    Comment   --             This medical record created using voice recognition software.             Aaron Grey Jr.  10/22/22 1945       Aaron Grey Jr.  10/22/22 1951       Wade Shaw MD  10/23/22 0007

## 2022-10-23 VITALS
BODY MASS INDEX: 17.56 KG/M2 | TEMPERATURE: 98.7 F | WEIGHT: 87.08 LBS | SYSTOLIC BLOOD PRESSURE: 116 MMHG | RESPIRATION RATE: 15 BRPM | HEART RATE: 93 BPM | OXYGEN SATURATION: 97 % | DIASTOLIC BLOOD PRESSURE: 62 MMHG | HEIGHT: 59 IN

## 2023-01-28 ENCOUNTER — HOSPITAL ENCOUNTER (EMERGENCY)
Facility: HOSPITAL | Age: 58
Discharge: HOME | End: 2023-01-28
Admitting: EMERGENCY MEDICINE
Payer: COMMERCIAL

## 2023-01-28 VITALS
WEIGHT: 96 LBS | OXYGEN SATURATION: 95 % | TEMPERATURE: 99.6 F | BODY MASS INDEX: 19.35 KG/M2 | HEIGHT: 59 IN | DIASTOLIC BLOOD PRESSURE: 88 MMHG | SYSTOLIC BLOOD PRESSURE: 118 MMHG | RESPIRATION RATE: 16 BRPM | HEART RATE: 94 BPM

## 2023-01-28 DIAGNOSIS — N39.0 URINARY TRACT INFECTION WITHOUT HEMATURIA, SITE UNSPECIFIED: ICD-10-CM

## 2023-01-28 DIAGNOSIS — R55 SYNCOPE, UNSPECIFIED SYNCOPE TYPE: Primary | ICD-10-CM

## 2023-01-28 LAB
ACETONE BLD QL: NEGATIVE
ALBUMIN SERPL-MCNC: 4.6 G/DL (ref 3.5–5.2)
ALBUMIN/GLOB SERPL: 1.4 G/DL
ALP SERPL-CCNC: 52 U/L (ref 39–117)
ALT SERPL W P-5'-P-CCNC: 14 U/L (ref 1–33)
AMPHET+METHAMPHET UR QL: NEGATIVE
ANION GAP SERPL CALCULATED.3IONS-SCNC: 20.8 MMOL/L (ref 5–15)
AST SERPL-CCNC: 19 U/L (ref 1–32)
BACTERIA UR QL AUTO: ABNORMAL /HPF
BARBITURATES UR QL SCN: NEGATIVE
BASOPHILS # BLD AUTO: 0.03 10*3/MM3 (ref 0–0.2)
BASOPHILS NFR BLD AUTO: 0.3 % (ref 0–1.5)
BENZODIAZ UR QL SCN: POSITIVE
BILIRUB SERPL-MCNC: 0.3 MG/DL (ref 0–1.2)
BILIRUB UR QL STRIP: NEGATIVE
BUN SERPL-MCNC: 8 MG/DL (ref 6–20)
BUN/CREAT SERPL: 7.9 (ref 7–25)
CALCIUM SPEC-SCNC: 9.5 MG/DL (ref 8.6–10.5)
CANNABINOIDS SERPL QL: NEGATIVE
CHLORIDE SERPL-SCNC: 98 MMOL/L (ref 98–107)
CLARITY UR: CLEAR
CO2 SERPL-SCNC: 18.2 MMOL/L (ref 22–29)
COCAINE UR QL: NEGATIVE
COLOR UR: YELLOW
CREAT SERPL-MCNC: 1.01 MG/DL (ref 0.57–1)
DEPRECATED RDW RBC AUTO: 42.3 FL (ref 37–54)
EGFRCR SERPLBLD CKD-EPI 2021: 65.1 ML/MIN/1.73
EOSINOPHIL # BLD AUTO: 0.01 10*3/MM3 (ref 0–0.4)
EOSINOPHIL NFR BLD AUTO: 0.1 % (ref 0.3–6.2)
ERYTHROCYTE [DISTWIDTH] IN BLOOD BY AUTOMATED COUNT: 12.7 % (ref 12.3–15.4)
GLOBULIN UR ELPH-MCNC: 3.2 GM/DL
GLUCOSE SERPL-MCNC: 161 MG/DL (ref 65–99)
GLUCOSE UR STRIP-MCNC: ABNORMAL MG/DL
HCT VFR BLD AUTO: 31.2 % (ref 34–46.6)
HGB BLD-MCNC: 10.7 G/DL (ref 12–15.9)
HGB UR QL STRIP.AUTO: NEGATIVE
HOLD SPECIMEN: NORMAL
HOLD SPECIMEN: NORMAL
HYALINE CASTS UR QL AUTO: ABNORMAL /LPF
IMM GRANULOCYTES # BLD AUTO: 0.09 10*3/MM3 (ref 0–0.05)
IMM GRANULOCYTES NFR BLD AUTO: 0.8 % (ref 0–0.5)
KETONES UR QL STRIP: ABNORMAL
LEUKOCYTE ESTERASE UR QL STRIP.AUTO: NEGATIVE
LYMPHOCYTES # BLD AUTO: 1.32 10*3/MM3 (ref 0.7–3.1)
LYMPHOCYTES NFR BLD AUTO: 11.7 % (ref 19.6–45.3)
MAGNESIUM SERPL-MCNC: 1.9 MG/DL (ref 1.6–2.6)
MCH RBC QN AUTO: 31.8 PG (ref 26.6–33)
MCHC RBC AUTO-ENTMCNC: 34.3 G/DL (ref 31.5–35.7)
MCV RBC AUTO: 92.6 FL (ref 79–97)
METHADONE UR QL SCN: NEGATIVE
MONOCYTES # BLD AUTO: 0.78 10*3/MM3 (ref 0.1–0.9)
MONOCYTES NFR BLD AUTO: 6.9 % (ref 5–12)
NEUTROPHILS NFR BLD AUTO: 80.2 % (ref 42.7–76)
NEUTROPHILS NFR BLD AUTO: 9.05 10*3/MM3 (ref 1.7–7)
NITRITE UR QL STRIP: NEGATIVE
NRBC BLD AUTO-RTO: 0 /100 WBC (ref 0–0.2)
OPIATES UR QL: NEGATIVE
OXYCODONE UR QL SCN: NEGATIVE
PH UR STRIP.AUTO: 5.5 [PH] (ref 5–8)
PLATELET # BLD AUTO: 431 10*3/MM3 (ref 140–450)
PMV BLD AUTO: 8.7 FL (ref 6–12)
POTASSIUM SERPL-SCNC: 3 MMOL/L (ref 3.5–5.2)
PROT SERPL-MCNC: 7.8 G/DL (ref 6–8.5)
PROT UR QL STRIP: ABNORMAL
RBC # BLD AUTO: 3.37 10*6/MM3 (ref 3.77–5.28)
RBC # UR STRIP: ABNORMAL /HPF
REF LAB TEST METHOD: ABNORMAL
SODIUM SERPL-SCNC: 137 MMOL/L (ref 136–145)
SP GR UR STRIP: 1.01 (ref 1–1.03)
SQUAMOUS #/AREA URNS HPF: ABNORMAL /HPF
TROPONIN T SERPL-MCNC: <0.01 NG/ML (ref 0–0.03)
UROBILINOGEN UR QL STRIP: ABNORMAL
WBC # UR STRIP: ABNORMAL /HPF
WBC NRBC COR # BLD: 11.28 10*3/MM3 (ref 3.4–10.8)
WHOLE BLOOD HOLD COAG: NORMAL
WHOLE BLOOD HOLD SPECIMEN: NORMAL

## 2023-01-28 PROCEDURE — 99284 EMERGENCY DEPT VISIT MOD MDM: CPT

## 2023-01-28 PROCEDURE — 82009 KETONE BODYS QUAL: CPT | Performed by: EMERGENCY MEDICINE

## 2023-01-28 PROCEDURE — 80307 DRUG TEST PRSMV CHEM ANLYZR: CPT | Performed by: EMERGENCY MEDICINE

## 2023-01-28 PROCEDURE — 85025 COMPLETE CBC W/AUTO DIFF WBC: CPT

## 2023-01-28 PROCEDURE — 93005 ELECTROCARDIOGRAM TRACING: CPT

## 2023-01-28 PROCEDURE — 80053 COMPREHEN METABOLIC PANEL: CPT

## 2023-01-28 PROCEDURE — 81001 URINALYSIS AUTO W/SCOPE: CPT | Performed by: EMERGENCY MEDICINE

## 2023-01-28 PROCEDURE — 84484 ASSAY OF TROPONIN QUANT: CPT

## 2023-01-28 PROCEDURE — 83735 ASSAY OF MAGNESIUM: CPT

## 2023-01-28 PROCEDURE — 93010 ELECTROCARDIOGRAM REPORT: CPT | Performed by: INTERNAL MEDICINE

## 2023-01-28 RX ORDER — POTASSIUM CHLORIDE 750 MG/1
40 CAPSULE, EXTENDED RELEASE ORAL ONCE
Status: COMPLETED | OUTPATIENT
Start: 2023-01-28 | End: 2023-01-28

## 2023-01-28 RX ORDER — CEPHALEXIN 500 MG/1
500 CAPSULE ORAL 2 TIMES DAILY
Qty: 14 CAPSULE | Refills: 0 | Status: SHIPPED | OUTPATIENT
Start: 2023-01-28 | End: 2023-02-04

## 2023-01-28 RX ORDER — SODIUM CHLORIDE 0.9 % (FLUSH) 0.9 %
10 SYRINGE (ML) INJECTION AS NEEDED
Status: DISCONTINUED | OUTPATIENT
Start: 2023-01-28 | End: 2023-01-29 | Stop reason: HOSPADM

## 2023-01-28 RX ADMIN — POTASSIUM CHLORIDE 40 MEQ: 10 CAPSULE, COATED, EXTENDED RELEASE ORAL at 22:51

## 2023-01-29 LAB — QT INTERVAL: 284 MS

## 2023-01-31 ENCOUNTER — APPOINTMENT (OUTPATIENT)
Dept: GENERAL RADIOLOGY | Facility: HOSPITAL | Age: 58
DRG: 101 | End: 2023-01-31
Payer: COMMERCIAL

## 2023-01-31 ENCOUNTER — HOSPITAL ENCOUNTER (INPATIENT)
Facility: HOSPITAL | Age: 58
LOS: 3 days | Discharge: HOME | DRG: 101 | End: 2023-02-04
Attending: EMERGENCY MEDICINE | Admitting: INTERNAL MEDICINE
Payer: COMMERCIAL

## 2023-01-31 ENCOUNTER — APPOINTMENT (OUTPATIENT)
Dept: CT IMAGING | Facility: HOSPITAL | Age: 58
DRG: 101 | End: 2023-01-31
Payer: COMMERCIAL

## 2023-01-31 DIAGNOSIS — R41.82 ALTERED MENTAL STATUS, UNSPECIFIED ALTERED MENTAL STATUS TYPE: Primary | ICD-10-CM

## 2023-01-31 DIAGNOSIS — R26.2 DIFFICULTY WALKING: ICD-10-CM

## 2023-01-31 DIAGNOSIS — R56.9 SEIZURE: ICD-10-CM

## 2023-01-31 DIAGNOSIS — F13.939 BENZODIAZEPINE WITHDRAWAL WITH COMPLICATION: ICD-10-CM

## 2023-01-31 LAB
ALBUMIN SERPL-MCNC: 4.9 G/DL (ref 3.5–5.2)
ALBUMIN/GLOB SERPL: 1.3 G/DL
ALP SERPL-CCNC: 62 U/L (ref 39–117)
ALT SERPL W P-5'-P-CCNC: 93 U/L (ref 1–33)
AMPHET+METHAMPHET UR QL: NEGATIVE
ANION GAP SERPL CALCULATED.3IONS-SCNC: 13.3 MMOL/L (ref 5–15)
APAP SERPL-MCNC: <5 MCG/ML (ref 0–30)
AST SERPL-CCNC: 224 U/L (ref 1–32)
BACTERIA UR QL AUTO: ABNORMAL /HPF
BARBITURATES UR QL SCN: NEGATIVE
BASOPHILS # BLD AUTO: 0.04 10*3/MM3 (ref 0–0.2)
BASOPHILS NFR BLD AUTO: 0.4 % (ref 0–1.5)
BENZODIAZ UR QL SCN: POSITIVE
BILIRUB SERPL-MCNC: 0.6 MG/DL (ref 0–1.2)
BILIRUB UR QL STRIP: NEGATIVE
BUN SERPL-MCNC: 15 MG/DL (ref 6–20)
BUN/CREAT SERPL: 15.5 (ref 7–25)
CALCIUM SPEC-SCNC: 10 MG/DL (ref 8.6–10.5)
CANNABINOIDS SERPL QL: NEGATIVE
CHLORIDE SERPL-SCNC: 97 MMOL/L (ref 98–107)
CLARITY UR: CLEAR
CO2 SERPL-SCNC: 25.7 MMOL/L (ref 22–29)
COCAINE UR QL: NEGATIVE
COLOR UR: YELLOW
CREAT SERPL-MCNC: 0.97 MG/DL (ref 0.57–1)
DEPRECATED RDW RBC AUTO: 41.2 FL (ref 37–54)
EGFRCR SERPLBLD CKD-EPI 2021: 68.3 ML/MIN/1.73
EOSINOPHIL # BLD AUTO: 0.04 10*3/MM3 (ref 0–0.4)
EOSINOPHIL NFR BLD AUTO: 0.4 % (ref 0.3–6.2)
ERYTHROCYTE [DISTWIDTH] IN BLOOD BY AUTOMATED COUNT: 12.6 % (ref 12.3–15.4)
ETHANOL BLD-MCNC: <10 MG/DL (ref 0–10)
ETHANOL UR QL: <0.01 %
GLOBULIN UR ELPH-MCNC: 3.8 GM/DL
GLUCOSE BLDC GLUCOMTR-MCNC: 128 MG/DL (ref 70–99)
GLUCOSE SERPL-MCNC: 111 MG/DL (ref 65–99)
GLUCOSE UR STRIP-MCNC: NEGATIVE MG/DL
HCT VFR BLD AUTO: 32.9 % (ref 34–46.6)
HGB BLD-MCNC: 11.4 G/DL (ref 12–15.9)
HGB UR QL STRIP.AUTO: ABNORMAL
HOLD SPECIMEN: NORMAL
HYALINE CASTS UR QL AUTO: ABNORMAL /LPF
IMM GRANULOCYTES # BLD AUTO: 0.04 10*3/MM3 (ref 0–0.05)
IMM GRANULOCYTES NFR BLD AUTO: 0.4 % (ref 0–0.5)
KETONES UR QL STRIP: ABNORMAL
LEUKOCYTE ESTERASE UR QL STRIP.AUTO: NEGATIVE
LYMPHOCYTES # BLD AUTO: 1.19 10*3/MM3 (ref 0.7–3.1)
LYMPHOCYTES NFR BLD AUTO: 11 % (ref 19.6–45.3)
MAGNESIUM SERPL-MCNC: 2.1 MG/DL (ref 1.6–2.6)
MCH RBC QN AUTO: 31.5 PG (ref 26.6–33)
MCHC RBC AUTO-ENTMCNC: 34.7 G/DL (ref 31.5–35.7)
MCV RBC AUTO: 90.9 FL (ref 79–97)
METHADONE UR QL SCN: NEGATIVE
MONOCYTES # BLD AUTO: 0.84 10*3/MM3 (ref 0.1–0.9)
MONOCYTES NFR BLD AUTO: 7.8 % (ref 5–12)
NEUTROPHILS NFR BLD AUTO: 8.67 10*3/MM3 (ref 1.7–7)
NEUTROPHILS NFR BLD AUTO: 80 % (ref 42.7–76)
NITRITE UR QL STRIP: NEGATIVE
NRBC BLD AUTO-RTO: 0 /100 WBC (ref 0–0.2)
OPIATES UR QL: NEGATIVE
OXYCODONE UR QL SCN: NEGATIVE
PH UR STRIP.AUTO: 6 [PH] (ref 5–8)
PLATELET # BLD AUTO: 460 10*3/MM3 (ref 140–450)
PMV BLD AUTO: 8.8 FL (ref 6–12)
POTASSIUM SERPL-SCNC: 3.5 MMOL/L (ref 3.5–5.2)
PROT SERPL-MCNC: 8.7 G/DL (ref 6–8.5)
PROT UR QL STRIP: ABNORMAL
RBC # BLD AUTO: 3.62 10*6/MM3 (ref 3.77–5.28)
RBC # UR STRIP: ABNORMAL /HPF
REF LAB TEST METHOD: ABNORMAL
SALICYLATES SERPL-MCNC: <0.3 MG/DL
SODIUM SERPL-SCNC: 136 MMOL/L (ref 136–145)
SP GR UR STRIP: 1.02 (ref 1–1.03)
SQUAMOUS #/AREA URNS HPF: ABNORMAL /HPF
TROPONIN T SERPL-MCNC: <0.01 NG/ML (ref 0–0.03)
UROBILINOGEN UR QL STRIP: ABNORMAL
WBC # UR STRIP: ABNORMAL /HPF
WBC NRBC COR # BLD: 10.82 10*3/MM3 (ref 3.4–10.8)
WHOLE BLOOD HOLD COAG: NORMAL
WHOLE BLOOD HOLD SPECIMEN: NORMAL

## 2023-01-31 PROCEDURE — P9612 CATHETERIZE FOR URINE SPEC: HCPCS

## 2023-01-31 PROCEDURE — 84484 ASSAY OF TROPONIN QUANT: CPT | Performed by: EMERGENCY MEDICINE

## 2023-01-31 PROCEDURE — 80307 DRUG TEST PRSMV CHEM ANLYZR: CPT | Performed by: EMERGENCY MEDICINE

## 2023-01-31 PROCEDURE — 71045 X-RAY EXAM CHEST 1 VIEW: CPT

## 2023-01-31 PROCEDURE — 84443 ASSAY THYROID STIM HORMONE: CPT | Performed by: INTERNAL MEDICINE

## 2023-01-31 PROCEDURE — 82962 GLUCOSE BLOOD TEST: CPT

## 2023-01-31 PROCEDURE — 82077 ASSAY SPEC XCP UR&BREATH IA: CPT | Performed by: EMERGENCY MEDICINE

## 2023-01-31 PROCEDURE — 80143 DRUG ASSAY ACETAMINOPHEN: CPT | Performed by: EMERGENCY MEDICINE

## 2023-01-31 PROCEDURE — 81001 URINALYSIS AUTO W/SCOPE: CPT | Performed by: EMERGENCY MEDICINE

## 2023-01-31 PROCEDURE — 80179 DRUG ASSAY SALICYLATE: CPT | Performed by: EMERGENCY MEDICINE

## 2023-01-31 PROCEDURE — 87086 URINE CULTURE/COLONY COUNT: CPT | Performed by: EMERGENCY MEDICINE

## 2023-01-31 PROCEDURE — 80053 COMPREHEN METABOLIC PANEL: CPT | Performed by: EMERGENCY MEDICINE

## 2023-01-31 PROCEDURE — 85025 COMPLETE CBC W/AUTO DIFF WBC: CPT | Performed by: EMERGENCY MEDICINE

## 2023-01-31 PROCEDURE — 36415 COLL VENOUS BLD VENIPUNCTURE: CPT

## 2023-01-31 PROCEDURE — 84439 ASSAY OF FREE THYROXINE: CPT | Performed by: INTERNAL MEDICINE

## 2023-01-31 PROCEDURE — 93005 ELECTROCARDIOGRAM TRACING: CPT | Performed by: EMERGENCY MEDICINE

## 2023-01-31 PROCEDURE — 70450 CT HEAD/BRAIN W/O DYE: CPT

## 2023-01-31 PROCEDURE — 93010 ELECTROCARDIOGRAM REPORT: CPT | Performed by: INTERNAL MEDICINE

## 2023-01-31 PROCEDURE — 83735 ASSAY OF MAGNESIUM: CPT | Performed by: EMERGENCY MEDICINE

## 2023-01-31 PROCEDURE — G0432 EIA HIV-1/HIV-2 SCREEN: HCPCS | Performed by: INTERNAL MEDICINE

## 2023-01-31 PROCEDURE — 99285 EMERGENCY DEPT VISIT HI MDM: CPT

## 2023-01-31 RX ORDER — SODIUM CHLORIDE 0.9 % (FLUSH) 0.9 %
10 SYRINGE (ML) INJECTION AS NEEDED
Status: DISCONTINUED | OUTPATIENT
Start: 2023-01-31 | End: 2023-02-04 | Stop reason: HOSPADM

## 2023-01-31 RX ADMIN — SODIUM CHLORIDE 1000 ML: 9 INJECTION, SOLUTION INTRAVENOUS at 19:20

## 2023-02-01 ENCOUNTER — APPOINTMENT (OUTPATIENT)
Dept: NEUROLOGY | Facility: HOSPITAL | Age: 58
DRG: 101 | End: 2023-02-01
Payer: COMMERCIAL

## 2023-02-01 ENCOUNTER — APPOINTMENT (OUTPATIENT)
Dept: MRI IMAGING | Facility: HOSPITAL | Age: 58
DRG: 101 | End: 2023-02-01
Payer: COMMERCIAL

## 2023-02-01 PROBLEM — R41.82 ALTERED MENTAL STATUS, UNSPECIFIED ALTERED MENTAL STATUS TYPE: Status: ACTIVE | Noted: 2023-02-01

## 2023-02-01 PROBLEM — R56.9 SEIZURE: Status: ACTIVE | Noted: 2023-02-01

## 2023-02-01 LAB
ALBUMIN SERPL-MCNC: 4 G/DL (ref 3.5–5.2)
ALBUMIN/GLOB SERPL: 1.4 G/DL
ALP SERPL-CCNC: 51 U/L (ref 39–117)
ALT SERPL W P-5'-P-CCNC: 72 U/L (ref 1–33)
ANION GAP SERPL CALCULATED.3IONS-SCNC: 13.5 MMOL/L (ref 5–15)
AST SERPL-CCNC: 154 U/L (ref 1–32)
BACTERIA SPEC AEROBE CULT: NO GROWTH
BILIRUB SERPL-MCNC: 0.4 MG/DL (ref 0–1.2)
BUN SERPL-MCNC: 14 MG/DL (ref 6–20)
BUN/CREAT SERPL: 16.7 (ref 7–25)
CALCIUM SPEC-SCNC: 8.8 MG/DL (ref 8.6–10.5)
CHLORIDE SERPL-SCNC: 103 MMOL/L (ref 98–107)
CO2 SERPL-SCNC: 21.5 MMOL/L (ref 22–29)
CREAT SERPL-MCNC: 0.84 MG/DL (ref 0.57–1)
EGFRCR SERPLBLD CKD-EPI 2021: 81.2 ML/MIN/1.73
GLOBULIN UR ELPH-MCNC: 2.9 GM/DL
GLUCOSE SERPL-MCNC: 103 MG/DL (ref 65–99)
MAGNESIUM SERPL-MCNC: 2 MG/DL (ref 1.6–2.6)
PHOSPHATE SERPL-MCNC: 2.8 MG/DL (ref 2.5–4.5)
POTASSIUM SERPL-SCNC: 3.2 MMOL/L (ref 3.5–5.2)
PROT SERPL-MCNC: 6.9 G/DL (ref 6–8.5)
QT INTERVAL: 371 MS
SODIUM SERPL-SCNC: 138 MMOL/L (ref 136–145)
T4 FREE SERPL-MCNC: 1.44 NG/DL (ref 0.93–1.7)
TSH SERPL DL<=0.05 MIU/L-ACNC: 0.46 UIU/ML (ref 0.27–4.2)

## 2023-02-01 PROCEDURE — 25010000002 LORAZEPAM PER 2 MG: Performed by: INTERNAL MEDICINE

## 2023-02-01 PROCEDURE — 80053 COMPREHEN METABOLIC PANEL: CPT | Performed by: PHYSICIAN ASSISTANT

## 2023-02-01 PROCEDURE — 95819 EEG AWAKE AND ASLEEP: CPT

## 2023-02-01 PROCEDURE — 92610 EVALUATE SWALLOWING FUNCTION: CPT

## 2023-02-01 PROCEDURE — 83735 ASSAY OF MAGNESIUM: CPT | Performed by: INTERNAL MEDICINE

## 2023-02-01 PROCEDURE — 0 POTASSIUM CHLORIDE 10 MEQ/100ML SOLUTION: Performed by: INTERNAL MEDICINE

## 2023-02-01 PROCEDURE — 0 LEVETIRACETAM IN NACL 0.75% 1000 MG/100ML SOLUTION: Performed by: INTERNAL MEDICINE

## 2023-02-01 PROCEDURE — 25010000002 SODIUM CHLORIDE 0.9 % WITH KCL 20 MEQ 20-0.9 MEQ/L-% SOLUTION: Performed by: INTERNAL MEDICINE

## 2023-02-01 PROCEDURE — 99223 1ST HOSP IP/OBS HIGH 75: CPT | Performed by: INTERNAL MEDICINE

## 2023-02-01 PROCEDURE — 70551 MRI BRAIN STEM W/O DYE: CPT

## 2023-02-01 PROCEDURE — 84100 ASSAY OF PHOSPHORUS: CPT | Performed by: PHYSICIAN ASSISTANT

## 2023-02-01 PROCEDURE — 99291 CRITICAL CARE FIRST HOUR: CPT | Performed by: INTERNAL MEDICINE

## 2023-02-01 RX ORDER — POTASSIUM CHLORIDE 7.45 MG/ML
10 INJECTION INTRAVENOUS
Status: DISCONTINUED | OUTPATIENT
Start: 2023-02-01 | End: 2023-02-01

## 2023-02-01 RX ORDER — FAMOTIDINE 20 MG/1
20 TABLET, FILM COATED ORAL NIGHTLY
Status: DISCONTINUED | OUTPATIENT
Start: 2023-02-01 | End: 2023-02-04 | Stop reason: HOSPADM

## 2023-02-01 RX ORDER — NITROGLYCERIN 0.4 MG/1
0.4 TABLET SUBLINGUAL
Status: DISCONTINUED | OUTPATIENT
Start: 2023-02-01 | End: 2023-02-04 | Stop reason: HOSPADM

## 2023-02-01 RX ORDER — ONDANSETRON 4 MG/1
4 TABLET, ORALLY DISINTEGRATING ORAL 2 TIMES DAILY PRN
COMMUNITY

## 2023-02-01 RX ORDER — PROPRANOLOL HYDROCHLORIDE 10 MG/1
1 TABLET ORAL DAILY
COMMUNITY
Start: 2023-01-05 | End: 2023-02-04 | Stop reason: HOSPADM

## 2023-02-01 RX ORDER — ENOXAPARIN SODIUM 100 MG/ML
40 INJECTION SUBCUTANEOUS DAILY
Status: DISCONTINUED | OUTPATIENT
Start: 2023-02-01 | End: 2023-02-01

## 2023-02-01 RX ORDER — LEVETIRACETAM 10 MG/ML
1000 INJECTION INTRAVASCULAR EVERY 12 HOURS SCHEDULED
Status: DISCONTINUED | OUTPATIENT
Start: 2023-02-01 | End: 2023-02-02

## 2023-02-01 RX ORDER — SODIUM CHLORIDE 0.9 % (FLUSH) 0.9 %
10 SYRINGE (ML) INJECTION EVERY 12 HOURS SCHEDULED
Status: DISCONTINUED | OUTPATIENT
Start: 2023-02-01 | End: 2023-02-04 | Stop reason: HOSPADM

## 2023-02-01 RX ORDER — ALPRAZOLAM 0.25 MG/1
0.5 TABLET ORAL 2 TIMES DAILY PRN
Status: DISCONTINUED | OUTPATIENT
Start: 2023-02-01 | End: 2023-02-04 | Stop reason: HOSPADM

## 2023-02-01 RX ORDER — LORAZEPAM 2 MG/ML
4 INJECTION INTRAMUSCULAR
Status: DISCONTINUED | OUTPATIENT
Start: 2023-02-01 | End: 2023-02-04 | Stop reason: HOSPADM

## 2023-02-01 RX ORDER — SODIUM CHLORIDE, SODIUM LACTATE, POTASSIUM CHLORIDE, CALCIUM CHLORIDE 600; 310; 30; 20 MG/100ML; MG/100ML; MG/100ML; MG/100ML
100 INJECTION, SOLUTION INTRAVENOUS CONTINUOUS
Status: DISCONTINUED | OUTPATIENT
Start: 2023-02-01 | End: 2023-02-01

## 2023-02-01 RX ORDER — SODIUM CHLORIDE 9 MG/ML
40 INJECTION, SOLUTION INTRAVENOUS AS NEEDED
Status: DISCONTINUED | OUTPATIENT
Start: 2023-02-01 | End: 2023-02-04 | Stop reason: HOSPADM

## 2023-02-01 RX ORDER — FAMOTIDINE 10 MG/ML
20 INJECTION, SOLUTION INTRAVENOUS EVERY 12 HOURS SCHEDULED
Status: DISCONTINUED | OUTPATIENT
Start: 2023-02-01 | End: 2023-02-01

## 2023-02-01 RX ORDER — SODIUM CHLORIDE AND POTASSIUM CHLORIDE 150; 900 MG/100ML; MG/100ML
125 INJECTION, SOLUTION INTRAVENOUS CONTINUOUS
Status: DISCONTINUED | OUTPATIENT
Start: 2023-02-01 | End: 2023-02-01

## 2023-02-01 RX ORDER — ONDANSETRON 2 MG/ML
4 INJECTION INTRAMUSCULAR; INTRAVENOUS EVERY 6 HOURS PRN
Status: DISCONTINUED | OUTPATIENT
Start: 2023-02-01 | End: 2023-02-04 | Stop reason: HOSPADM

## 2023-02-01 RX ORDER — ONDANSETRON 4 MG/1
4 TABLET, ORALLY DISINTEGRATING ORAL 2 TIMES DAILY PRN
Status: DISCONTINUED | OUTPATIENT
Start: 2023-02-01 | End: 2023-02-04 | Stop reason: HOSPADM

## 2023-02-01 RX ORDER — SODIUM CHLORIDE 0.9 % (FLUSH) 0.9 %
10 SYRINGE (ML) INJECTION AS NEEDED
Status: DISCONTINUED | OUTPATIENT
Start: 2023-02-01 | End: 2023-02-04 | Stop reason: HOSPADM

## 2023-02-01 RX ADMIN — POTASSIUM CHLORIDE 10 MEQ: 7.46 INJECTION, SOLUTION INTRAVENOUS at 08:17

## 2023-02-01 RX ADMIN — LEVETIRACETAM 1000 MG: 10 INJECTION, SOLUTION INTRAVENOUS at 20:57

## 2023-02-01 RX ADMIN — POTASSIUM CHLORIDE 10 MEQ: 7.46 INJECTION, SOLUTION INTRAVENOUS at 09:13

## 2023-02-01 RX ADMIN — Medication 10 ML: at 20:56

## 2023-02-01 RX ADMIN — Medication 10 ML: at 01:43

## 2023-02-01 RX ADMIN — ALPRAZOLAM 0.5 MG: 0.25 TABLET ORAL at 21:19

## 2023-02-01 RX ADMIN — LEVETIRACETAM 1000 MG: 10 INJECTION, SOLUTION INTRAVENOUS at 12:46

## 2023-02-01 RX ADMIN — Medication 10 ML: at 09:14

## 2023-02-01 RX ADMIN — FAMOTIDINE 20 MG: 10 INJECTION INTRAVENOUS at 12:46

## 2023-02-01 RX ADMIN — LORAZEPAM 4 MG: 2 INJECTION INTRAMUSCULAR; INTRAVENOUS at 01:56

## 2023-02-01 RX ADMIN — SODIUM CHLORIDE, POTASSIUM CHLORIDE, SODIUM LACTATE AND CALCIUM CHLORIDE 100 ML/HR: 600; 310; 30; 20 INJECTION, SOLUTION INTRAVENOUS at 09:13

## 2023-02-01 RX ADMIN — FAMOTIDINE 20 MG: 20 TABLET, FILM COATED ORAL at 20:57

## 2023-02-01 RX ADMIN — POTASSIUM CHLORIDE AND SODIUM CHLORIDE 125 ML/HR: 900; 150 INJECTION, SOLUTION INTRAVENOUS at 01:42

## 2023-02-01 NOTE — CONSULTS
"  [facility]   Consult Note      Patient Name: Lisa To  : 1965  MRN: [id number]  Primary Care Physician:  Rafita Schmidt MD  Date of admission: 2023    Subjective   Subjective     This 57 years old woman was seen upon the request of Dr. Reed for evaluation    Chief Complaint:   Seizures    HPI:  The information was obtained on the phone from the boyfriend who is driving to go to [place] and from the sister.  The sister witnessed the event.  The sister mentioned that she was with her when the spell happened.  She was shaking all over for about 2 hours before she became stiff and jerking for about 5 minutes.  Afterwards, she was all right.  She had her first seizure sometime around November or 2022.  She is not on any anticonvulsant therapy.    Review of Systems  There is no headache, dizziness, nausea or vomiting.  No chest pains or abdominal pains.    According to her boyfriend, has not been eating.  She has not eaten since 2023.      Past Medical History:   Diagnosis Date   • Allergic rhinitis    • Anxiety    • Hyperlipidemia 2021   • Pain, lumbar region        Past Surgical History:   Procedure Laterality Date   •  SECTION     • COLONOSCOPY  2017    Dr. Clarke   • HYSTERECTOMY         Family History: family history includes Cancer in an other family member; Colon cancer in an other family member; Diabetes in an other family member; Heart disease in an other family member. Otherwise pertinent FHx was reviewed and not pertinent to current issue.    Social History:  reports that she has never smoked. She has never used smokeless tobacco. She reports that she does not currently use alcohol. She reports that she does not currently use drugs.     She smoked marijuana for unknown period of time.  According to the sister, she stopped on \"drugs\" for 10 years or more.  The sister does not know the name of the drugs that she was involved " in.    Psychosocial History: She has depression.  She attempted to commit suicide in the past.    Home Medications:  ALPRAZolam, Phenazopyridine HCl, cephalexin, estradiol, loratadine, and metoprolol succinate XL      Allergies:  Allergies   Allergen Reactions   • Morphine Hives       Vitals:   Temp:  [98.6 °F (37 °C)-99.1 °F (37.3 °C)] 98.8 °F (37.1 °C)  Heart Rate:  [] 92  Resp:  [12-16] 16  BP: (104-143)/(50-92) 108/54  Flow (L/min):  [2] 2  Physical Exam   She was awake and alert.  She was not in any form distress she was fairly developed and fairly nourished.    Her heart was regular heart rate was 86/min.  There were no murmurs.  The lungs were clear.    The carotid pulses were 1+ and equal.  There were no bruits on either side.    Neurological Examination:   Responses were coherent and relevant.  She was aware of what was going on around her.  She has no insight to her condition.  She was disoriented to date and place chest.  She is not aware of what is going on around him.  She was able to understand and follow verbal commands.    I did not look into the fundus on either side because of the COVID-19 pandemic social distancing.    The pupils were 3 to 4 mm. They were round and equal reactive to light directly and consensually.  There was no extraocular muscle weakness.    No facial asymmetry.  No facial weakness.  Was able to hear normal conversational speech.    The strength of the sternomastoid and trapezius muscles was normal and symmetrical.  The uvula and the tongue were in the midline.    The strength in both upper and lower extremities was normal and equal.    Felt pinprick equal in both sides of the forehead, face, lower jaw, both upper and lower extremities, and both sides of her trunk.    The deep tendon reflexes were hypoactive to absent on both sides.    She did finger-to-nose test fairly well equal on both sides.      Result Review    Result Review:  I have personally reviewed the results  from the time of this admission to 2/1/2023 10:24 EST and agree with these findings:  [x]  Laboratory  []  Microbiology  [x]  Radiology  []  EKG/Telemetry   []  Cardiology/Vascular   []  Pathology  []  Old records  []  Other:  Most notable findings include:   February 1, 2023  I reviewed the completed images of the CAT scan of her brain that was done on January 31, 2023.  This study showed no acute changes.  It was essentially normal.    The blood work showed slightly elevated blood sugar of 103, slightly low potassium 3.2 and low carbon [place] of 21.5.  ALT 72 which is elevated and the SGOT or AST is 154 which is also elevated.    CBC shows elevated WBC count with predominance of granulocytes.    Thyroid profile is normal.    Lipid profile shows elevated cholesterol 239 and elevated LDL cholesterol 148.  HDL cholesterol was normal at 55.  Triglycerides also elevated 179.      Impression:    Generalized Seizure Disorder  Drug Abuse  Major Depression.        Plan:   Continue Keppra 1 g twice a day.  We will see what the EEG and MRI of the brain show.  Thank you very much for letting me see this patient.  I will follow patient with you.              Please note that portions of this note were completed with a voice recognition program.  Part of this note is an electric or electronic transcription/translation of spoken language to printed text using the dragon dictating system.    Electronically signed by Varinder ePnnington Jr., MD, 02/01/23, 10:24 AM EST.

## 2023-02-01 NOTE — NURSING NOTE
"At 0155, Dr. Noble yelled to this RN to pull 4mg of Ativan for Pt because she was having a seizure. Pt was shaking her head and looked as if she was \"gasping for air\" per staff report. Staff entered room and turned Pt to her left side. Pt was given Ativan, seizure activity stopped. Seizure lasted 2-3 minutes. Pt responding only to sternal rubs/pain at this time. 2L NC placed on Pt. Dr. Noble transferring Pt to CCU for closer monitoring.   "

## 2023-02-01 NOTE — H&P
[facility]   HISTORY AND PHYSICAL    Patient Name: Lisa To  : 1965  MRN: [id number]  Primary Care Physician: Rafita Schmidt MD  Date of admission: 2023    Subjective   Subjective     Chief Complaint: AMS/concern for seizure    History of Present Illness  Note: Patient is unable to provide any history.  As such, all history comes from ED reports.    57-year-old woman with an unclear seizure history, anxiety, and hyperlipidemia presents with altered mental status and a concern for seizure.  Per family the patient started to be disoriented sometime around noon.  Additionally the reports of some difficulty walking/balance issues.  Her sister notes that at one point she became distinctly deaf and then began shaking for about 5 minutes.  There was an uncertain report of possibly foaming at the mouth.  Patient does take Xanax as an outpatient and possibly has been without it since her recent discharge from detention.    Note: The patient presented to our ED on 2023 for reported syncope.  (See note from )  At that time, she denied nausea, vomiting, headache, abdominal pain, dysuria, hematuria, or apparently any history of seizures.  There is some confusion around this, because a nurse heard that the sister said she did have a seizure history.    The ED reported the patient was nonverbal at the time of their exam.  When I examined the patient she could answer her name and could say she did not know when asked her where she was.  Her exam was notable for inappropriate laughing in response to questions and will be described as a twitch of her forearms bilaterally (not a tremor).  Moments after my exam, I heard nausea in the room and reassessed the patient and she appeared to be seizing with head thrown back, grunting noise, and tense tonic-clonic type movements.  The tonic-clonic features seem to resolve prior to our ability to actually get Ativan into the IV but the timing is very close.   The patient then had limited response after that but it certainly could be response to the 4 mg of Ativan.    Due to the uncertainty of her diagnosis and the recurrence of the seizures, the patient was moved to the ICU for closer monitoring and for the possibility of needing a drip.    In the ED:  VSS    Potassium 3.5    ALT/AST 93/224    Alcohol: Negative  Tylenol: Negative    Urinalysis:  -Looks contaminated    U tox:  -Positive for benzodiazepines only    Imaging: Unremarkable    Cerebell: Negative for any seizure-like activity    ED consulted Dr. Reed who says he will see the patient in the a.m.      Review of Systems   Impossible to assess but when not seizing in no distress at all.  Personal History     Past Medical History:   Diagnosis Date   • Allergic rhinitis    • Anxiety    • Hyperlipidemia 2021   • Pain, lumbar region        Past Surgical History:   Procedure Laterality Date   •  SECTION     • COLONOSCOPY  2017    Dr. Clarke   • HYSTERECTOMY         Family History: family history includes Cancer in an other family member; Colon cancer in an other family member; Diabetes in an other family member; Heart disease in an other family member. Otherwise pertinent FHx was reviewed and not pertinent to current issue.    Social History:  reports that she has never smoked. She has never used smokeless tobacco. She reports that she does not currently use alcohol. She reports that she does not currently use drugs.    Home Medications:  ALPRAZolam, Phenazopyridine HCl, cephalexin, estradiol, loratadine, metoprolol succinate XL, and ondansetron      Allergies:  Allergies   Allergen Reactions   • Morphine Hives       Objective    Objective     Vitals:  Temp:  [98.6 °F (37 °C)-99.1 °F (37.3 °C)] 98.6 °F (37 °C)  Heart Rate:  [] 97  Resp:  [12-16] 16  BP: (111-143)/(57-92) 143/92    Physical Exam  Constitutional:       Appearance: Normal appearance. She is normal weight.   HENT:      Head:  Normocephalic and atraumatic.      Nose: Nose normal.      Mouth/Throat:      Mouth: Mucous membranes are moist.   Eyes:      Extraocular Movements: Extraocular movements intact.      Conjunctiva/sclera: Conjunctivae normal.      Pupils: Pupils appear dilated but are equal, round, and reactive to light.   Cardiovascular:      Rate and Rhythm: Normal rate and regular rhythm.      Heart sounds: Normal heart sounds.   Pulmonary:      Effort: Pulmonary effort is normal.      Breath sounds: Normal breath sounds.   Musculoskeletal:         General: Normal range of motion.   Skin:     General: Skin is warm and dry.   Neurological:      General: No focal deficit present.      Mental Status: She is alert and oriented only to person.  Psychiatric:         Mood and Affect: Mood normal.         Behavior: Behavior normal.      Result Review    Result Review:  I have personally reviewed the results from the time of this admission to 2/1/2023 02:33 EST and agree with these findings:  [x]  Laboratory list / accordion  [x]  Microbiology  [x]  Radiology  [x]  EKG/Telemetry   []  Cardiology/Vascular   []  Pathology  [x]  Old records  []  Other:  Most notable findings include: Largely unremarkable        Assessment & Plan   Assessment / Plan     Brief Patient Summary:  57-year-old woman with an unclear seizure history, anxiety, and hyperlipidemia presents with altered mental status and a concern for seizure.     Active Hospital Problems:  Active Hospital Problems    Diagnosis    • **Altered mental status, unspecified altered mental status type        Plan:   Seizure-like activity  -Unclear seizure history  -Unclear substance use history  -Moved to the ICU  [] Follow-up with neurology      DVT prophylaxis: Lovenox    CODE STATUS:    Code Status and Medical Interventions:   Ordered at: 02/01/23 0013     Code Status (Patient has no pulse and is not breathing):    CPR (Attempt to Resuscitate)     Medical Interventions (Patient has pulse or  is breathing):    Full Support       Admission Status:  I believe this patient meets inpatient status.    Lane Noble MD,

## 2023-02-01 NOTE — NURSING NOTE
Attempted to call Pt's sister, Barbara, and update her that Pt was transferred to CCU room 10. Left message to call back.     Update at 0500: Barbara called back, updated her on patient status and of transfer to CCU room 10.

## 2023-02-01 NOTE — SIGNIFICANT NOTE
02/01/23 0945   Coping/Psychosocial   Observed Emotional State calm;cooperative;happy   Verbalized Emotional State hopefulness   Trust Relationship/Rapport empathic listening provided   Involvement in Care interacting with patient   Family/Support System Care support provided   Additional Documentation Spiritual Care (Group)   Spiritual Care   Use of Spiritual Resources non-Rastafarian use of spiritual care   Spiritual Care Source  initiative   Spiritual Care Follow-Up follow-up, none required as presently assessed   Response to Spiritual Care receptive of support;thanks expressed   Spiritual Care Interventions supportive conversation provided   Spiritual Care Visit Type initial   Receptivity to Spiritual Care visit welcomed

## 2023-02-01 NOTE — CONSULTS
Pulmonary / Critical Care Consult Note      Patient Name: Lisa To  : 1965  MRN: [id number]  Primary Care Physician:  Rafita Schmidt MD  Referring Physician: No ref. provider found  Date of admission: 2023    Subjective   Subjective     Reason for Consult/ Chief Complaint: seizure activity    HPI:  Lisa To is a 57 y.o. female who was transferred from PCU to CCU due to seizure activity described as gasping for air and shaking her head.  Patient received Ativan and then transferred to CCU for monitoring.  All information is obtained from chart, as patient is not able to give me history.  Per ER report, patient was brought in for AMS that started today around noon and having trouble keeping her balance and walking.  Shortly thereafter starting having tonic clonic activity reported at 5 minutes in length.  Patient was reported to be nonverbal and possibly foaming at mouth.   No seizure activity noted while in ED.  Work up performed in ed was with UDS positive for benzo's.  Ceribell evaluation was performed while in ED for 2 hours with 0 % seizure burden noted.         Review of Systems  Unable to obtain due to somnolent post Ativan adminstration      Personal History     Past Medical History:   Diagnosis Date   • Allergic rhinitis    • Anxiety    • Hyperlipidemia 2021   • Pain, lumbar region        Past Surgical History:   Procedure Laterality Date   •  SECTION     • COLONOSCOPY  2017    Dr. Clarke   • HYSTERECTOMY         Family History: family history includes Cancer in an other family member; Colon cancer in an other family member; Diabetes in an other family member; Heart disease in an other family member. Otherwise pertinent FHx was reviewed and not pertinent to current issue.    Social History:  reports that she has never smoked. She has never used smokeless tobacco. She reports that she does not currently use alcohol. She reports that she does not  currently use drugs.    Home Medications:  ALPRAZolam, Phenazopyridine HCl, cephalexin, estradiol, loratadine, metoprolol succinate XL, and ondansetron    Allergies:  Allergies   Allergen Reactions   • Morphine Hives       Objective    Objective     Vitals:   Temp:  [98.6 °F (37 °C)-99.1 °F (37.3 °C)] 98.6 °F (37 °C)  Heart Rate:  [] 97  Resp:  [12-16] 16  BP: (111-143)/(57-92) 143/92  Flow (L/min):  [2] 2    Physical Exam:  Vital Signs Reviewed   Thin female with BMI of 21. Somnolent, NAD.    HEENT:  PERRL, EOMI.  OP, nares clear, MMM  Neck:  Supple, no JVD, no thyromegaly  Lymph: no axillary, cervical, supraclavicular lymphadenopathy noted bilaterally  Chest:  good aeration, clear to auscultation bilaterally, tympanic to percussion bilaterally, no work of breathing noted  CV: RRR, no MGR, pulses 2+, equal.  Abd:  Scaphoid,  Soft, NT, ND, + BS, no HSM  EXT:  no clubbing, no cyanosis, no edema, no joint tenderness  Neuro:  A&Ox1, does wake up and follow simple commands, CN grossly intact, no focal deficits.  Skin: No rashes or lesions noted      Result Review    Result Review:  I have personally reviewed the results from the time of this admission to 2/1/2023 03:44 EST and agree with these findings:  [x]  Laboratory  []  Microbiology  [x]  Radiology  [x]  EKG/Telemetry   []  Cardiology/Vascular   []  Pathology  []  Old records   []  Other:  Most notable findings include:       Lab 02/01/23  0047 01/31/23  1832 01/28/23 2009   WBC  --  10.82* 11.28*   HEMOGLOBIN  --  11.4* 10.7*   HEMATOCRIT  --  32.9* 31.2*   PLATELETS  --  460* 431   SODIUM 138 136 137   POTASSIUM 3.2* 3.5 3.0*   CHLORIDE 103 97* 98   CO2 21.5* 25.7 18.2*   BUN 14 15 8   CREATININE 0.84 0.97 1.01*   GLUCOSE 103* 111* 161*   CALCIUM 8.8 10.0 9.5   PHOSPHORUS 2.8  --   --    TOTAL PROTEIN 6.9 8.7* 7.8   ALBUMIN 4.0 4.9 4.6   GLOBULIN 2.9 3.8 3.2         Assessment & Plan   Assessment / Plan     Active Hospital Problems:  Active Hospital  Problems    Diagnosis    • **Altered mental status, unspecified altered mental status type        Impression:    Altered mental status  Urinary tract infection unspecified organism  ?  New onset seizure  Anxiety  Hypokalemia  Benzodiazepine use     Plan:    Patient received 4 mg IV Ativan for possible seizure activity  CT head reviewed no acute abnormalities  We will order EEG and brain MRI  Patient reported no history of seizures before  Neurology consulted  Patient does not need speech evaluation, okay to do bedside swallow, advance diet as tolerated  Continue seizure precautions  Trend renal function, monitor replace electrolytes, replace potassium IV today  Change IV fluids to lactated Ringer's  Urine culture in process, will follow    DVT prophylaxis:  SCD's    Code Status and Medical Interventions:   Ordered at: 02/01/23 0013     Code Status (Patient has no pulse and is not breathing):    CPR (Attempt to Resuscitate)     Medical Interventions (Patient has pulse or is breathing):    Full Support       Patient is critically ill with concern for seizure activity, abnormal UA concerning for UTI, AMS.   We have personally reviewed all pertinent labs, imaging, microbiology and documentation. We have discussed care with the primary service as well as at multidisciplinary critical care rounds with the bedside nurse, respiratory therapist, pharmacist and all other ancillary services. 30 minutes of critical care time was spent managing this patient, excluding procedures. Of this time, I spent 18 minutes and SARMAD Tan spent 12 minutes in accordance with split shared billing.    Electronically signed by Ariel Drake MD, 02/01/23, 10:48 AM EST.

## 2023-02-01 NOTE — THERAPY EVALUATION
Acute Care - Speech Language Pathology   Swallow Initial Evaluation  [facility]     Patient Name: Lisa To  : 1965  MRN: [id number]  Today's Date: 2023               Admit Date: 2023    Visit Dx:     ICD-10-CM ICD-9-CM   1. Altered mental status, unspecified altered mental status type  R41.82 780.97   2. Seizure (HCC)  R56.9 780.39   3. Benzodiazepine withdrawal with complication (HCC)  F13.939 292.0     305.40     Patient Active Problem List   Diagnosis   • Hyperlipidemia   • Hypotension due to drugs   • OAB (overactive bladder)   • Bladder irritation   • Altered mental status, unspecified altered mental status type   • Seizure (HCC)     Past Medical History:   Diagnosis Date   • Allergic rhinitis    • Anxiety    • Hyperlipidemia 2021   • Pain, lumbar region      Past Surgical History:   Procedure Laterality Date   •  SECTION     • COLONOSCOPY  2017    Dr. Clarke   • HYSTERECTOMY       PAIN SCALE: None noted    PRECAUTIONS/CONTRAINDICATIONS: None noted    SUSPECTED ABUSE/NEGLECT/EXPLOITATION: None noted    SOCIAL/PSYCHOLOGICAL NEEDS/BARRIERS: Anxiety    PAST SOCIAL HISTORY: Lives at home    PRIOR FUNCTION: Independent    PATIENT GOALS/EXPECTATIONS: Did not report    HISTORY: This patient is a 57-year-old admitted with the above diagnosis.  Patient denies any prior history of dysphagia or trouble swallowing    CURRENT DIET LEVEL: N/A    OBJECTIVE:    TEST ADMINISTERED: Clinical dysphagia evaluation    COGNITION/SAFETY AWARENESS: Alert and oriented     BEHAVIORAL OBSERVATIONS: Pleasant and cooperative    ORAL MOTOR EXAM: Lingual and labial strength and range of motion within functional limits    VOICE QUALITY: Within normal limits    REFLEX EXAM: Deferred    POSTURE: 90 degrees upright    FEEDING/SWALLOWING FUNCTION: Assessed with thin liquids, purée consistencies and regular solids    CLINICAL OBSERVATIONS: Oral stage is characterized by good bolus preparation and  control.  Timely oral transit.  Pharyngeal phase appears timely with good laryngeal elevation per palpation.  No clinical signs or symptoms of aspiration noted.  Vocal quality remained clear to auscultation    DYSPHAGIA CRITERIA: N/A    FUNCTIONAL ASSESSMENT INSTRUMENT: Patient currently scored a level 7 of 7 on Functional Communication Measures for swallowing indicating a 0% limitation in function.    ASSESSMENT/ PLAN OF CARE:  Pt presents with functional oropharyngeal swallow.  No clinical signs or symptoms of aspiration noted.  Unable to rule out silent aspiration at bedside  REHAB POTENTIAL:  Pt has good rehab potential.  The following limitations may influence improvement/ length of tx medical status.    RECOMMENDATIONS:   1.   DIET: Regular diet-thin liquids    2.  POSITION: 90 degrees upright for all intake    3.  COMPENSATORY STRATEGIES: General swallow precautions    Pt/responsible party agrees with plan of care and has been informed of all alternatives, risks and benefits.     No further dysphagia therapy is indicated at this time.  Available for reconsult should patient's medical status warrant.    EDUCATION  The patient has been educated in the following areas:   Dysphagia (Swallowing Impairment).                Maxine Munoz, SLP  2/1/2023

## 2023-02-02 LAB
ALBUMIN SERPL-MCNC: 3.7 G/DL (ref 3.5–5.2)
ALP SERPL-CCNC: 49 U/L (ref 39–117)
ALT SERPL W P-5'-P-CCNC: 55 U/L (ref 1–33)
ANION GAP SERPL CALCULATED.3IONS-SCNC: 8.9 MMOL/L (ref 5–15)
AST SERPL-CCNC: 75 U/L (ref 1–32)
BILIRUB CONJ SERPL-MCNC: <0.2 MG/DL (ref 0–0.3)
BILIRUB INDIRECT SERPL-MCNC: ABNORMAL MG/DL
BILIRUB SERPL-MCNC: 0.4 MG/DL (ref 0–1.2)
BUN SERPL-MCNC: 9 MG/DL (ref 6–20)
BUN/CREAT SERPL: 12.2 (ref 7–25)
CALCIUM SPEC-SCNC: 8.8 MG/DL (ref 8.6–10.5)
CHLORIDE SERPL-SCNC: 104 MMOL/L (ref 98–107)
CO2 SERPL-SCNC: 24.1 MMOL/L (ref 22–29)
CREAT SERPL-MCNC: 0.74 MG/DL (ref 0.57–1)
EGFRCR SERPLBLD CKD-EPI 2021: 94.5 ML/MIN/1.73
GLUCOSE SERPL-MCNC: 92 MG/DL (ref 65–99)
PHOSPHATE SERPL-MCNC: 3.2 MG/DL (ref 2.5–4.5)
POTASSIUM SERPL-SCNC: 2.9 MMOL/L (ref 3.5–5.2)
PROT SERPL-MCNC: 6.3 G/DL (ref 6–8.5)
SODIUM SERPL-SCNC: 137 MMOL/L (ref 136–145)

## 2023-02-02 PROCEDURE — 80076 HEPATIC FUNCTION PANEL: CPT | Performed by: INTERNAL MEDICINE

## 2023-02-02 PROCEDURE — 99233 SBSQ HOSP IP/OBS HIGH 50: CPT | Performed by: INTERNAL MEDICINE

## 2023-02-02 PROCEDURE — 84100 ASSAY OF PHOSPHORUS: CPT | Performed by: INTERNAL MEDICINE

## 2023-02-02 PROCEDURE — 99231 SBSQ HOSP IP/OBS SF/LOW 25: CPT | Performed by: STUDENT IN AN ORGANIZED HEALTH CARE EDUCATION/TRAINING PROGRAM

## 2023-02-02 PROCEDURE — 0 LEVETIRACETAM IN NACL 0.75% 1000 MG/100ML SOLUTION: Performed by: INTERNAL MEDICINE

## 2023-02-02 PROCEDURE — 80048 BASIC METABOLIC PNL TOTAL CA: CPT | Performed by: INTERNAL MEDICINE

## 2023-02-02 RX ORDER — POTASSIUM CHLORIDE 750 MG/1
40 CAPSULE, EXTENDED RELEASE ORAL
Status: DISCONTINUED | OUTPATIENT
Start: 2023-02-02 | End: 2023-02-03

## 2023-02-02 RX ORDER — ESTRADIOL 1 MG/1
1 TABLET ORAL DAILY
Status: DISCONTINUED | OUTPATIENT
Start: 2023-02-02 | End: 2023-02-04 | Stop reason: HOSPADM

## 2023-02-02 RX ORDER — LEVETIRACETAM 750 MG/1
750 TABLET ORAL 2 TIMES DAILY
Status: DISCONTINUED | OUTPATIENT
Start: 2023-02-02 | End: 2023-02-04 | Stop reason: HOSPADM

## 2023-02-02 RX ORDER — TRAZODONE HYDROCHLORIDE 50 MG/1
25 TABLET ORAL ONCE
Status: COMPLETED | OUTPATIENT
Start: 2023-02-02 | End: 2023-02-02

## 2023-02-02 RX ADMIN — TRAZODONE HYDROCHLORIDE 25 MG: 50 TABLET ORAL at 00:25

## 2023-02-02 RX ADMIN — LEVETIRACETAM 1000 MG: 10 INJECTION, SOLUTION INTRAVENOUS at 08:30

## 2023-02-02 RX ADMIN — ALPRAZOLAM 0.5 MG: 0.25 TABLET ORAL at 20:16

## 2023-02-02 RX ADMIN — LEVETIRACETAM 750 MG: 750 TABLET, FILM COATED ORAL at 20:16

## 2023-02-02 RX ADMIN — POTASSIUM CHLORIDE 40 MEQ: 10 CAPSULE, COATED, EXTENDED RELEASE ORAL at 17:44

## 2023-02-02 RX ADMIN — Medication 10 ML: at 20:16

## 2023-02-02 RX ADMIN — Medication 10 ML: at 08:30

## 2023-02-02 RX ADMIN — FAMOTIDINE 20 MG: 20 TABLET, FILM COATED ORAL at 20:16

## 2023-02-02 RX ADMIN — ESTRADIOL 1 MG: 1 TABLET ORAL at 00:25

## 2023-02-02 NOTE — PLAN OF CARE
Goal Outcome Evaluation:      Pt has been sleeping all day. No complaints of anxiety or pain. VSS. Will continue to monitor. Call light is within reach.     Matt Mora, RNA

## 2023-02-02 NOTE — PROGRESS NOTES
Pulmonary / Critical Care Progress Note      Patient Name: Lisa To  : 1965  MRN: [id number]  Attending:  Morgan Anna MD  Date of admission: 2023    Subjective   Subjective   Follow-up for ICU transfer to floor    Over past 24 hours:  No acute events overnight  Nasal cannula off nose  On 2 L patient had SPO2 99%  No acute issues, did not want to talk much this morning    Review of Systems  General: Denied complaints  Cardiovascular:  Denied complaints  Respiratory: Denied complaints  Gastrointestinal: Denied complaints        Objective   Objective     Vitals:   Temp:  [98.1 °F (36.7 °C)-98.5 °F (36.9 °C)] 98.1 °F (36.7 °C)  Heart Rate:  [74-98] 98  Resp:  [17-18] 17  BP: ()/(51-71) 120/71  Flow (L/min):  [2] 2    Physical Exam   Vital Signs Reviewed   General:  WDWN, Alert, NAD.    HEENT:  PERRL, EOMI.  OP, nares clear  Chest:  good aeration, clear to auscultation bilaterally, no work of breathing noted RA  CV: RRR, no MGR, pulses 2+, equal.  Abd:  Soft, NT, ND, + BS  EXT:  no clubbing, no cyanosis, no edema  Neuro:  A&Ox3, CN grossly intact, no focal deficits.  Skin: No rashes or lesions noted      Result Review    Result Review:  I have personally reviewed the results from the time of this admission to 2023 11:23 EST and agree with these findings:  [x]  Laboratory  [x]  Microbiology  [x]  Radiology  []  EKG/Telemetry   []  Cardiology/Vascular   []  Pathology  []  Old records  []  Other:  Most notable findings include:   -     Assessment & Plan   Assessment / Plan     Active Hospital Problems:  Active Hospital Problems    Diagnosis    • **Altered mental status, unspecified altered mental status type    • Seizure (HCC)          Impression:  New onset seizure  Altered mental status, improving  History of anxiety  Hypokalemia     Plan:  -on 2L NC but not properly placed; Can likely transition off of nasal cannula as tolerated  -Can add nebulizers as needed  -Rest of care per  primary  -Pulmonary will sign off for now unless new issues arise please call with questions    DVT prophylaxis:  Mechanical DVT prophylaxis orders are present.    CODE STATUS:   Code Status (Patient has no pulse and is not breathing): CPR (Attempt to Resuscitate)  Medical Interventions (Patient has pulse or is breathing): Full Support      Labs, images, and medications personally reviewed.    Discussed with patient    Electronically signed by Sander Grewal MD, 02/02/23, 11:23 AM EST.

## 2023-02-02 NOTE — PROGRESS NOTES
[facility]   Hospitalist Progress Note  Date: 2023  Patient Name: Lisa To  : 1965  MRN: [id number]  Date of admission: 2023      Subjective   Subjective     Chief Complaint: Change in mental status, concern for seizure    Summary:   Note: Patient is unable to provide any history.  As such, all history comes from ED reports.     57-year-old woman with an unclear seizure history, anxiety, and hyperlipidemia presents with altered mental status and a concern for seizure.  Per family the patient started to be disoriented sometime around noon.  Additionally the reports of some difficulty walking/balance issues.  Her sister notes that at one point she became distinctly deaf and then began shaking for about 5 minutes.  There was an uncertain report of possibly foaming at the mouth.  Patient does take Xanax as an outpatient and possibly has been without it since her recent discharge from retirement.     Note: The patient presented to our ED on 2023 for reported syncope.  (See note from )  At that time, she denied nausea, vomiting, headache, abdominal pain, dysuria, hematuria, or apparently any history of seizures.  There is some confusion around this, because a nurse heard that the sister said she did have a seizure history.     The ED reported the patient was nonverbal at the time of their exam.  When I examined the patient she could answer her name and could say she did not know when asked her where she was.  Her exam was notable for inappropriate laughing in response to questions and will be described as a twitch of her forearms bilaterally (not a tremor).  Moments after my exam, I heard nausea in the room and reassessed the patient and she appeared to be seizing with head thrown back, grunting noise, and tense tonic-clonic type movements.  The tonic-clonic features seem to resolve prior to our ability to actually get Ativan into the IV but the timing is very close.  The patient  then had limited response after that but it certainly could be response to the 4 mg of Ativan.     Due to the uncertainty of her diagnosis and the recurrence of the seizures, the patient was moved to the ICU for closer monitoring and for the possibility of needing a drip.  Transferred out of the unit on February 1.     In the ED:  VSS     Potassium 3.5     ALT/AST 93/224     Alcohol: Negative  Tylenol: Negative     Urinalysis:  -Looks contaminated     U tox:  -Positive for benzodiazepines only     Imaging: Unremarkable     Cerebell: Negative for any seizure-like activity     ED consulted neurology who says he will see the patient in the a.m. patient evaluated by Dr. Pennington and agreed to continue Keppra.      Interval Followup:   Vital signs stable on room air.  Patient was sleeping but woke up oriented x2.  Per nursing staff she has been appropriate.  Was up all night asking for sleeping medications  Patient poor historian and does not talk much.  Patient does not recall what happened and why she got to the hospital.  Patient not sure how long she has been out of Xanax, who prescribes it to her and why she was in [facility].    Review of Systems   All systems were reviewed and negative except for:     Objective   Objective     Vitals:   Temp:  [97.3 °F (36.3 °C)-98.4 °F (36.9 °C)] 97.6 °F (36.4 °C)  Heart Rate:  [77-98] 77  Resp:  [17-20] 18  BP: (112-130)/(53-71) 129/64  Flow (L/min):  [2] 2  Physical Exam    Constitutional: Awake, alert, no acute distress   Eyes: Pupils equal, sclerae anicteric, no conjunctival injection   HENT: NCAT, mucous membranes moist   Neck: Supple, no thyromegaly, no lymphadenopathy, trachea midline   Respiratory: Clear to auscultation bilaterally, nonlabored respirations    Cardiovascular: RRR, no murmurs, rubs, or gallops, palpable pedal pulses bilaterally   Gastrointestinal: Positive bowel sounds, soft, nontender, nondistended   Musculoskeletal: No bilateral ankle edema, no clubbing or  cyanosis to extremities   Psychiatric: Appropriate affect, cooperative   Neurologic: Oriented x 2, strength symmetric in all extremities, Cranial Nerves grossly intact to confrontation, speech clear   Skin: No rashes     Result Review    Result Review:  I have personally reviewed the results from the time of this admission to 2/2/2023 16:47 EST and agree with these findings:  [x]  Laboratory  []  Microbiology  [x]  Radiology  [x]  EKG/Telemetry normal sinus rhythm  []  Cardiology/Vascular   []  Pathology  [x]  Old records  [x]  Other: Medications    Assessment & Plan   Assessment / Plan     Assessment:  Acute change in mental status.  Resolved.  Likely seizure activity.  Started on Keppra.  Anxiety disorder on Xanax.  Transaminitis.  Improving.  Hypokalemia.  Supplemented.  Asymptomatic bacteriuria.  Urine culture negative     Plan:  IV Keppra changed to p.o.  Of IV fluid.  Hold home Inderal.  Patient not sure if she is taking it.  Resume as needed Xanax.  Replace electrolytes.  MRI of the brain negative.  EEG pending.  Appreciate neurology input.  Urine drug screen with benzos.  Continue telemetry.  Trend LFTs.  PT OT speech therapy.      Discussed plan with RN and .  If remains stable discharge home in a.m.    DVT prophylaxis:  Mechanical DVT prophylaxis orders are present.  SCD    CODE STATUS:   Code Status (Patient has no pulse and is not breathing): CPR (Attempt to Resuscitate)  Medical Interventions (Patient has pulse or is breathing): Full Support      Part of this note may be an electronic transcription/translation of spoken language to printed text using the Dragon Dictation System.     Electronically signed by Morgan Anna MD, 02/02/23, 4:47 PM EST.

## 2023-02-02 NOTE — SIGNIFICANT NOTE
02/02/23 1125   Coping/Psychosocial   Observed Emotional State calm   Verbalized Emotional State anxiety   Trust Relationship/Rapport empathic listening provided   Involvement in Care interacting with patient   Additional Documentation Spiritual Care (Group)   Spiritual Care   Use of Spiritual Resources non-Faith use of spiritual care   Spiritual Care Source  initiative   Spiritual Care Follow-Up follow-up planned regularly for general support   Response to Spiritual Care engagement, unsatisfactory   Spiritual Care Interventions supportive conversation provided;other (see comments)  (The Pt falls asleep while talking)   Spiritual Care Visit Type initial   Receptivity to Spiritual Care unresponsive

## 2023-02-03 LAB
ALBUMIN SERPL-MCNC: 4.2 G/DL (ref 3.5–5.2)
AMMONIA BLD-SCNC: 31 UMOL/L (ref 11–51)
ANION GAP SERPL CALCULATED.3IONS-SCNC: 10 MMOL/L (ref 5–15)
BUN SERPL-MCNC: 11 MG/DL (ref 6–20)
BUN/CREAT SERPL: 12.9 (ref 7–25)
CALCIUM SPEC-SCNC: 9.4 MG/DL (ref 8.6–10.5)
CHLORIDE SERPL-SCNC: 102 MMOL/L (ref 98–107)
CO2 SERPL-SCNC: 25 MMOL/L (ref 22–29)
CREAT SERPL-MCNC: 0.85 MG/DL (ref 0.57–1)
EGFRCR SERPLBLD CKD-EPI 2021: 80 ML/MIN/1.73
GLUCOSE SERPL-MCNC: 122 MG/DL (ref 65–99)
HAV IGM SERPL QL IA: NORMAL
HBV CORE IGM SERPL QL IA: NORMAL
HBV SURFACE AG SERPL QL IA: NORMAL
HCV AB SER DONR QL: NORMAL
HIV1+2 AB SER QL: NORMAL
MAGNESIUM SERPL-MCNC: 1.8 MG/DL (ref 1.6–2.6)
PHOSPHATE SERPL-MCNC: 2.7 MG/DL (ref 2.5–4.5)
POTASSIUM SERPL-SCNC: 3.4 MMOL/L (ref 3.5–5.2)
SODIUM SERPL-SCNC: 137 MMOL/L (ref 136–145)
WHOLE BLOOD HOLD SPECIMEN: NORMAL

## 2023-02-03 PROCEDURE — 99233 SBSQ HOSP IP/OBS HIGH 50: CPT | Performed by: INTERNAL MEDICINE

## 2023-02-03 PROCEDURE — 83735 ASSAY OF MAGNESIUM: CPT | Performed by: INTERNAL MEDICINE

## 2023-02-03 PROCEDURE — 82140 ASSAY OF AMMONIA: CPT | Performed by: INTERNAL MEDICINE

## 2023-02-03 PROCEDURE — 80069 RENAL FUNCTION PANEL: CPT | Performed by: INTERNAL MEDICINE

## 2023-02-03 PROCEDURE — 97161 PT EVAL LOW COMPLEX 20 MIN: CPT

## 2023-02-03 PROCEDURE — 80074 ACUTE HEPATITIS PANEL: CPT | Performed by: INTERNAL MEDICINE

## 2023-02-03 RX ORDER — POTASSIUM CHLORIDE 750 MG/1
20 CAPSULE, EXTENDED RELEASE ORAL 2 TIMES DAILY
Qty: 10 CAPSULE | Refills: 0 | Status: CANCELLED | OUTPATIENT
Start: 2023-02-03 | End: 2023-02-05

## 2023-02-03 RX ADMIN — Medication 10 ML: at 20:04

## 2023-02-03 RX ADMIN — LEVETIRACETAM 750 MG: 750 TABLET, FILM COATED ORAL at 20:04

## 2023-02-03 RX ADMIN — FAMOTIDINE 20 MG: 20 TABLET, FILM COATED ORAL at 20:04

## 2023-02-03 RX ADMIN — POTASSIUM CHLORIDE 40 MEQ: 10 CAPSULE, COATED, EXTENDED RELEASE ORAL at 08:15

## 2023-02-03 RX ADMIN — ALPRAZOLAM 0.5 MG: 0.25 TABLET ORAL at 20:04

## 2023-02-03 RX ADMIN — ESTRADIOL 1 MG: 1 TABLET ORAL at 08:15

## 2023-02-03 RX ADMIN — LEVETIRACETAM 750 MG: 750 TABLET, FILM COATED ORAL at 08:15

## 2023-02-03 RX ADMIN — Medication 10 ML: at 08:15

## 2023-02-03 NOTE — THERAPY EVALUATION
Acute Care - Physical Therapy Initial Evaluation   [facility]     Patient Name: Lisa To  : 1965  MRN: [id number]  Today's Date: 2/3/2023      Visit Dx:     ICD-10-CM ICD-9-CM   1. Altered mental status, unspecified altered mental status type  R41.82 780.97   2. Seizure (HCC)  R56.9 780.39   3. Benzodiazepine withdrawal with complication (HCC)  F13.939 292.0     305.40   4. Difficulty walking  R26.2 719.7     Patient Active Problem List   Diagnosis   • Hyperlipidemia   • Hypotension due to drugs   • OAB (overactive bladder)   • Bladder irritation   • Altered mental status, unspecified altered mental status type   • Seizure (HCC)     Past Medical History:   Diagnosis Date   • Allergic rhinitis    • Anxiety    • Hyperlipidemia 2021   • Pain, lumbar region      Past Surgical History:   Procedure Laterality Date   •  SECTION     • COLONOSCOPY  2017    Dr. Clarke   • HYSTERECTOMY       PT Assessment (last 12 hours)     PT Evaluation and Treatment     Row Name 23 0910          Physical Therapy Time and Intention    Subjective Information complains of;weakness (P)   -JL     Document Type evaluation (P)   -JL     Mode of Treatment individual therapy (P)   -JL     Patient Effort adequate (P)   -JL     Row Name 23 0910          General Information    Patient Profile Reviewed yes (P)   -JL     Patient Observations alert;cooperative;agree to therapy (P)   -JL     Prior Level of Function independent: (P)   -JL     Equipment Currently Used at Home none (P)   -JL     Existing Precautions/Restrictions fall (P)   -JL     Barriers to Rehab none identified (P)   -JL     Row Name 23 0910          Living Environment    Current Living Arrangements home (P)   -JL     People in Home friend(s) (P)   -JL     Primary Care Provided by self (P)   -JL     Row Name 23 0910          Range of Motion (ROM)    Range of Motion ROM is WFL;bilateral lower extremities (P)   -JL     Row Name  02/03/23 0910          Strength (Manual Muscle Testing)    Strength (Manual Muscle Testing) bilateral lower extremities (P)   4/5 All planes  -     Row Name 02/03/23 0910          Bed Mobility    Bed Mobility bed mobility (all) activities (P)   -JL     All Activities, [place] (Bed Mobility) standby assist (P)   -JL     Row Name 02/03/23 0910          Transfers    Transfers sit-stand transfer;stand-sit transfer (P)   -     Row Name 02/03/23 0910          Sit-Stand Transfer    Sit-Stand [place] (Transfers) standby assist (P)   -JL     Assistive Device (Sit-Stand Transfers) walker, front-wheeled (P)   -JL     Row Name 02/03/23 0910          Stand-Sit Transfer    Stand-Sit [place] (Transfers) standby assist (P)   -JL     Assistive Device (Stand-Sit Transfers) walker, 4-wheeled (P)   -     Row Name 02/03/23 0910          Gait/Stairs (Locomotion)    Gait/Stairs Locomotion gait/ambulation assistive device (P)   -JL     [place] Level (Gait) contact guard (P)   -JL     Assistive Device (Gait) walker, front-wheeled (P)   -JL     Distance in Feet (Gait) 100 (P)   -JL     Pattern (Gait) step-through (P)   -JL     Deviations/Abnormal Patterns (Gait) other (see comments) (P)   Pt. with occasional unsteadiness and impulsiveness, took her hands off RW while continuing to ambulate. Occasional lateral deviations of RW while ambulating, able to correct and prevent LOB.  -     Row Name 02/03/23 0910          Safety Issues, Functional Mobility    Impairments Affecting Function (Mobility) balance;cognition;strength (P)   -JL     Cognitive Impairments, Mobility Safety/Performance impulsivity (P)   -     Row Name 02/03/23 0910          Balance    Balance Assessment standing dynamic balance (P)   -JL     Dynamic Standing Balance contact guard (P)   -JL     Position/Device Used, Standing Balance walker, front-wheeled (P)   -     Row Name 02/03/23 0910          Plan of Care Review    Plan of Care Reviewed With  patient (P)   -JL     Outcome Evaluation Pt. presents with impaired balance and impulsivity with ambulation that places her at increased fall risk. Recommend dishcarge home when medically cleared and cognitive status improves. (P)   -JL     Row Name 02/03/23 0910          Positioning and Restraints    Pre-Treatment Position in bed (P)   -JL     Post Treatment Position bed (P)   -JL     In Bed supine;call light within reach (P)   -JL     Row Name 02/03/23 0910          Therapy Assessment/Plan (PT)    Rehab Potential (PT) fair, will monitor progress closely (P)   -JL     Criteria for Skilled Interventions Met (PT) yes (P)   -JL     Therapy Frequency (PT) daily (P)   -JL     Predicted Duration of Therapy Intervention (PT) 10 days (P)   -JL     Problem List (PT) problems related to;balance;cognition;mobility;strength (P)   -JL     Activity Limitations Related to Problem List (PT) unable to ambulate safely (P)   -JL     Row Name 02/03/23 0910          Therapy Plan Review/Discharge Plan (PT)    Therapy Plan Review (PT) evaluation/treatment results reviewed;patient (P)   -JL     Row Name 02/03/23 0910          Physical Therapy Goals    Gait Training Goal Selection (PT) gait training, PT goal 1 (P)   -JL     Balance Goal Selection (PT) balance, PT goal 1 (P)   -JL     Row Name 02/03/23 0910          Gait Training Goal 1 (PT)    Activity/Assistive Device (Gait Training Goal 1, PT) gait (walking locomotion) (P)   -JL     [place] Level (Gait Training Goal 1, PT) standby assist (P)   -JL     Distance (Gait Training Goal 1, PT) 300 ft (P)   -JL     Time Frame (Gait Training Goal 1, PT) long term goal (LTG);10 days (P)   -JL     Row Name 02/03/23 0910          Balance Goal 1 (PT)    Activity/Assistive Device (Balance Goal 1, PT) standing, dynamic (P)   -JL     [place] Level/Cues Needed (Balance Goal 1, PT) standby assist (P)   -JL     Time Frame (Balance Goal 1, PT) long term goal (LTG);10 days (P)   -JL           User  Key  (r) = Recorded By, (t) = Taken By, (c) = Cosigned By    Initials Name Provider Type    Kai Peña, PT Student PT Student                Physical Therapy Education     Title: PT OT SLP Therapies (Done)     Topic: Physical Therapy (Done)     Point: Mobility training (Done)     Learning Progress Summary           Patient Acceptance, E, VU by MARLNEY at 2/3/2023 0919                               User Key     Initials Effective Dates Name Provider Type Discipline    MARLENY 01/11/23 -  Kai Walsh, PT Student PT Student PT              PT Recommendation and Plan  Anticipated Discharge Disposition (PT): (P) home  Planned Therapy Interventions (PT): (P) balance training, gait training, patient/family education, strengthening, transfer training  Therapy Frequency (PT): (P) daily  Plan of Care Reviewed With: (P) patient  Outcome Evaluation: (P) Pt. presents with impaired balance and impulsivity with ambulation that places her at increased fall risk. Recommend dishcarge home when medically cleared and cognitive status improves.   Outcome Measures     Row Name 02/03/23 0900             How much help from another person do you currently need...    Turning from your back to your side while in flat bed without using bedrails? 4 (P)   -JL      Moving from lying on back to sitting on the side of a flat bed without bedrails? 4 (P)   -JL      Moving to and from a bed to a chair (including a wheelchair)? 3 (P)   -JL      Standing up from a chair using your arms (e.g., wheelchair, bedside chair)? 3 (P)   -JL      Climbing 3-5 steps with a railing? 3 (P)   -JL      To walk in hospital room? 3 (P)   -MARLENY      AM-PAC 6 Clicks Score (PT) 20 (P)   -MARLENY            User Key  (r) = Recorded By, (t) = Taken By, (c) = Cosigned By    Initials Name Provider Type    Kai Peña, PT Student PT Student                 Time Calculation:    PT Charges     Row Name 02/03/23 0910             Time Calculation    PT Received On 02/03/23 (P)   -MARLENY       PT Goal Re-Cert Due Date 02/12/23 (P)   -JL         Untimed Charges    PT Eval/Re-eval Minutes 25 (P)   -JL         Total Minutes    Untimed Charges Total Minutes 25 (P)   -JL       Total Minutes 25 (P)   -JL            User Key  (r) = Recorded By, (t) = Taken By, (c) = Cosigned By    Initials Name Provider Type    Kai Peña, PT Student PT Student              Therapy Charges for Today     Code Description Service Date Service Provider Modifiers Qty    05703704158 HC PT EVAL LOW COMPLEXITY 2 2/3/2023 Kai Walsh, PT Student GP 1          PT G-Codes  AM-PAC 6 Clicks Score (PT): (P) 20    Kai Walsh PT Student  2/3/2023

## 2023-02-03 NOTE — PLAN OF CARE
Goal Outcome Evaluation:  Plan of Care Reviewed With: (P) patient           Outcome Evaluation: (P) Pt. presents with impaired balance and impulsivity with ambulation that places her at increased fall risk. Recommend dishcarge home when medically cleared and cognitive status improves.

## 2023-02-03 NOTE — CONSULTS
"Nutrition Services    Patient Name: Lisa To  YOB: 1965  MRN: [id number]  Admission date: 1/31/2023      CLINICAL NUTRITION ASSESSMENT      Reason for Assessment  Identified at risk by screening criteria, BMI     H&P:    Past Medical History:   Diagnosis Date   • Allergic rhinitis    • Anxiety    • Hyperlipidemia 9/21/2021   • Pain, lumbar region         Current Problems:   Active Hospital Problems    Diagnosis    • **Altered mental status, unspecified altered mental status type    • Seizure (HCC)         Nutrition/Diet History         Narrative     RD following r/t low BMI for age. Pt presents r/t AMS and seizure activity. Pt w/intermittent confusion and some forgetfulness, wanting to sleep during the day per RN. Per notes, pt has not been eating since 1/27 and PO intake this admission 0-25% (mostly 0%). Pt w/Hx w/some stated wts, but overall appear to be trending down x 2 yrs. Pt also arrested and in skilled nursing twice last year w/possible limited access to adequate kcals. Pt sleeping on side w/blanket over her when visited. Will add ONS at this time and follow per policy or sooner.        Anthropometrics        Current Height, Weight Height: 149.9 cm (59\")  Weight: 47 kg (103 lb 9.9 oz)   Current BMI Body mass index is 20.93 kg/m².       Weight Hx  Wt Readings from Last 30 Encounters:   02/01/23 0140 47 kg (103 lb 9.9 oz)   01/31/23 1756 46.9 kg (103 lb 6.3 oz)   01/28/23 2001 43.5 kg (96 lb)   10/22/22 1835 39.5 kg (87 lb 1.3 oz)   03/04/22 1650 46.8 kg (103 lb 2.8 oz)   01/31/22 1451 48.6 kg (107 lb 3.2 oz)   10/06/21 1347 48.1 kg (106 lb)   06/29/21 2106 51.8 kg (114 lb 3.2 oz)   01/07/21 0000 49.7 kg (109 lb 8 oz)   01/04/21 0000 49.4 kg (109 lb)   12/07/20 0000 51.7 kg (114 lb)   11/30/20 0000 51.3 kg (113 lb)            Wt Change Observation some stated wts, but overall appear to be trending down x 2 yrs     Estimated/Assessed Needs       Energy Requirements 25 - 30 kcal/kg IBW   EST " Needs (kcal/day) 1220 - 1464       Protein Requirements 1 - 1.25 g/kg IBW   EST Daily Needs (g/day) 49 - 61       Fluid Requirements 1 mL/kcal    Estimated Needs (mL/day) 1220 - 1464     Labs/Medications         Pertinent Labs Reviewed.   Results from last 7 days   Lab Units 02/03/23 0449 02/02/23 0457 02/01/23 0047 01/31/23  1832   SODIUM mmol/L 137 137 138 136   POTASSIUM mmol/L 3.4* 2.9* 3.2* 3.5   CHLORIDE mmol/L 102 104 103 97*   CO2 mmol/L 25.0 24.1 21.5* 25.7   BUN mg/dL 11 9 14 15   CREATININE mg/dL 0.85 0.74 0.84 0.97   CALCIUM mg/dL 9.4 8.8 8.8 10.0   BILIRUBIN mg/dL  --  0.4 0.4 0.6   ALK PHOS U/L  --  49 51 62   ALT (SGPT) U/L  --  55* 72* 93*   AST (SGOT) U/L  --  75* 154* 224*   GLUCOSE mg/dL 122* 92 103* 111*     Results from last 7 days   Lab Units 02/03/23 0449 02/02/23 0457 02/01/23 0047 01/31/23  1832   MAGNESIUM mg/dL 1.8  --  2.0 2.1   PHOSPHORUS mg/dL 2.7   < > 2.8  --    HEMOGLOBIN g/dL  --   --   --  11.4*   HEMATOCRIT %  --   --   --  32.9*    < > = values in this interval not displayed.     Coronavirus (COVID-19)   Date Value Ref Range Status   12/05/2020 NOT DETECTED NA Final     Comment:     The SARS-CoV-2 assay is a real-time, RT-PCR test intended  for the qualitative detection of nucleic acid from the  SARS-CoV-2 in respiratory specimens from individuals,  testing performed at [facility].       No results found for: HGBA1C      Pertinent Medications Reviewed.     Current Nutrition Orders & Evaluation of Intake       Oral Nutrition     Current PO Diet Diet: Regular/House Diet; Texture: Regular Texture (IDDSI 7); Fluid Consistency: Thin (IDDSI 0)   Supplement No active supplement orders       Malnutrition Severity Assessment                Nutrition Diagnosis         Nutrition Dx Problem 1 Inadequate oral Intake related to AMS and seizure activity as evidenced by family report. and per nursing documentation.       Nutrition Intervention         *Continue regular  diet    *Add Boost Plus TID w/meals    *Monitor electrolytes and replace as eneded    *Monitor wts a minimum of weekly     Medical Nutrition Therapy/Nutrition Education          Learner     Readiness N/A  Education not appropriate at this time     Method     Response N/A  N/A     Monitor/Evaluation        Monitor I&O, PO intake, Supplement intake, Pertinent labs, Weight, Symptoms, RFS       Nutrition Discharge Plan         To be determined       Electronically signed by:  Dayna Cross RD  02/03/23 14:50 EST

## 2023-02-03 NOTE — PROGRESS NOTES
[facility]   Hospitalist Progress Note  Date: 2/3/2023  Patient Name: Lisa To  : 1965  MRN: [id number]  Date of admission: 2023      Subjective   Subjective     Chief Complaint: Change in mental status, concern for seizure    Summary:   Note: Patient is unable to provide any history.  As such, all history comes from ED reports.     57-year-old woman with an unclear seizure history, anxiety, and hyperlipidemia presents with altered mental status and a concern for seizure.  Per family the patient started to be disoriented sometime around noon.  Additionally the reports of some difficulty walking/balance issues.  Her sister notes that at one point she became distinctly deaf and then began shaking for about 5 minutes.  There was an uncertain report of possibly foaming at the mouth.  Patient does take Xanax as an outpatient and possibly has been without it since her recent discharge from correction.     Note: The patient presented to our ED on 2023 for reported syncope.  (See note from )  At that time, she denied nausea, vomiting, headache, abdominal pain, dysuria, hematuria, or apparently any history of seizures.  There is some confusion around this, because a nurse heard that the sister said she did have a seizure history.     The ED reported the patient was nonverbal at the time of their exam.  When I examined the patient she could answer her name and could say she did not know when asked her where she was.  Her exam was notable for inappropriate laughing in response to questions and will be described as a twitch of her forearms bilaterally (not a tremor).  Moments after my exam, I heard nausea in the room and reassessed the patient and she appeared to be seizing with head thrown back, grunting noise, and tense tonic-clonic type movements.  The tonic-clonic features seem to resolve prior to our ability to actually get Ativan into the IV but the timing is very close.  The patient  then had limited response after that but it certainly could be response to the 4 mg of Ativan.     Due to the uncertainty of her diagnosis and the recurrence of the seizures, the patient was moved to the ICU for closer monitoring and for the possibility of needing a drip.  Transferred out of the unit on February 1.     In the ED:  VSS     Potassium 3.5     ALT/AST 93/224     Alcohol: Negative  Tylenol: Negative     Urinalysis:  -Looks contaminated     U tox:  -Positive for benzodiazepines only     Imaging: Unremarkable     [place]: Negative for any seizure-like activity     ED consulted neurology who says he will see the patient in the a.m. patient evaluated by Dr. Pennington and agreed to continue Keppra.     Interval Followup:   Patient reports feeling terrible but wanting to go home.  Does not really complain of anything specific.     Review of Systems   All systems were reviewed and negative except for what is outlined above    Objective   Objective     Vitals:   Temp:  [97.3 °F (36.3 °C)-98.5 °F (36.9 °C)] 98.1 °F (36.7 °C)  Heart Rate:  [] 103  Resp:  [16-20] 16  BP: (110-129)/(63-73) 119/63  Physical Exam    Constitutional: Lying in bed with a blanket over her head, no distress   Eyes: Pupils equal, sclerae anicteric, no conjunctival injection   HENT: NCAT, mucous membranes moist   Neck: Supple, no thyromegaly   Respiratory: Clear to auscultation bilaterally, nonlabored respirations    Cardiovascular: RRR, no m   Gastrointestinal: Positive bowel sounds, soft, nontender, nondistended   Musculoskeletal: No bilateral ankle edema, no clubbing or cyanosis to extremities   Psychiatric: Flat affect, seems to be in an unhappy mood   Neurologic: No gross focal deficits appreciated.  Alert and oriented.   Skin: No rashes     Result Review    Result Review:  I have reviewed the following  [x]  Laboratory   CBC    CBC 10/22/22 1/28/23 1/31/23   WBC 7.86 11.28 (A) 10.82 (A)   RBC 3.46 (A) 3.37 (A) 3.62 (A)   Hemoglobin  11.0 (A) 10.7 (A) 11.4 (A)   Hematocrit 31.2 (A) 31.2 (A) 32.9 (A)   MCV 90.2 92.6 90.9   MCH 31.8 31.8 31.5   MCHC 35.3 34.3 34.7   RDW 11.9 (A) 12.7 12.6   Platelets 320 431 460 (A)   (A) Abnormal value            CMP    CMP 2/1/23 2/2/23 2/2/23 2/3/23     0457 0457    Glucose 103 (A)  92 122 (A)   BUN 14  9 11   Creatinine 0.84  0.74 0.85   eGFR 81.2  94.5 80.0   Sodium 138  137 137   Potassium 3.2 (A)  2.9 (A) 3.4 (A)   Chloride 103  104 102   Calcium 8.8  8.8 9.4   Total Protein 6.9 6.3     Albumin 4.0 3.7  4.2   Globulin 2.9      Total Bilirubin 0.4 0.4     Alkaline Phosphatase 51 49     AST (SGOT) 154 (A) 75 (A)     ALT (SGPT) 72 (A) 55 (A)     Albumin/Globulin Ratio 1.4      BUN/Creatinine Ratio 16.7  12.2 12.9   Anion Gap 13.5  8.9 10.0   (A) Abnormal value              []  Microbiology  []  Radiology  []  EKG/Telemetry   []  Cardiology/Vascular   []  Pathology  []  Old records  []  Other: Medications    Assessment & Plan   Assessment / Plan     Assessment:  · Acute change in mental status, improved  · Probable seizure activity  · Anxiety disorder  · Transaminitis  · Hypokalemia  · Asymptomatic bacteruria    Plan:  · Continue oral Keppra  · Replacing potassium  · Holding inderal as unsure if she was taking  · Xanax as needed  · Check ammonia   · Will check HIV and hepatitis panel  · Home within 24 hours most likely      DVT prophylaxis:  Mechanical DVT prophylaxis orders are present.  SCD    CODE STATUS:   Code Status (Patient has no pulse and is not breathing): CPR (Attempt to Resuscitate)  Medical Interventions (Patient has pulse or is breathing): Full Support    Electronically signed by Hemanth Hollis MD, 02/03/23, 10:16 AM EST.

## 2023-02-04 ENCOUNTER — READMISSION MANAGEMENT (OUTPATIENT)
Dept: CALL CENTER | Facility: HOSPITAL | Age: 58
End: 2023-02-04
Payer: COMMERCIAL

## 2023-02-04 VITALS
RESPIRATION RATE: 16 BRPM | TEMPERATURE: 97 F | BODY MASS INDEX: 20.89 KG/M2 | OXYGEN SATURATION: 99 % | HEIGHT: 59 IN | HEART RATE: 71 BPM | SYSTOLIC BLOOD PRESSURE: 124 MMHG | DIASTOLIC BLOOD PRESSURE: 80 MMHG | WEIGHT: 103.62 LBS

## 2023-02-04 PROBLEM — R41.82 ALTERED MENTAL STATUS, UNSPECIFIED ALTERED MENTAL STATUS TYPE: Status: RESOLVED | Noted: 2023-02-01 | Resolved: 2023-02-04

## 2023-02-04 PROBLEM — R56.9 SEIZURE: Status: RESOLVED | Noted: 2023-02-01 | Resolved: 2023-02-04

## 2023-02-04 LAB
ALBUMIN SERPL-MCNC: 4.1 G/DL (ref 3.5–5.2)
ALBUMIN/GLOB SERPL: 1.5 G/DL
ALP SERPL-CCNC: 56 U/L (ref 39–117)
ALT SERPL W P-5'-P-CCNC: 42 U/L (ref 1–33)
ANION GAP SERPL CALCULATED.3IONS-SCNC: 12.3 MMOL/L (ref 5–15)
AST SERPL-CCNC: 30 U/L (ref 1–32)
BASOPHILS # BLD AUTO: 0.03 10*3/MM3 (ref 0–0.2)
BASOPHILS NFR BLD AUTO: 0.4 % (ref 0–1.5)
BILIRUB CONJ SERPL-MCNC: <0.2 MG/DL (ref 0–0.3)
BILIRUB SERPL-MCNC: 0.4 MG/DL (ref 0–1.2)
BUN SERPL-MCNC: 12 MG/DL (ref 6–20)
BUN/CREAT SERPL: 16.7 (ref 7–25)
CALCIUM SPEC-SCNC: 9.2 MG/DL (ref 8.6–10.5)
CHLORIDE SERPL-SCNC: 103 MMOL/L (ref 98–107)
CO2 SERPL-SCNC: 22.7 MMOL/L (ref 22–29)
CREAT SERPL-MCNC: 0.72 MG/DL (ref 0.57–1)
DEPRECATED RDW RBC AUTO: 40.4 FL (ref 37–54)
EGFRCR SERPLBLD CKD-EPI 2021: 97.7 ML/MIN/1.73
EOSINOPHIL # BLD AUTO: 0.05 10*3/MM3 (ref 0–0.4)
EOSINOPHIL NFR BLD AUTO: 0.6 % (ref 0.3–6.2)
ERYTHROCYTE [DISTWIDTH] IN BLOOD BY AUTOMATED COUNT: 12.1 % (ref 12.3–15.4)
GLOBULIN UR ELPH-MCNC: 2.7 GM/DL
GLUCOSE SERPL-MCNC: 114 MG/DL (ref 65–99)
HCT VFR BLD AUTO: 29.9 % (ref 34–46.6)
HGB BLD-MCNC: 10.3 G/DL (ref 12–15.9)
IMM GRANULOCYTES # BLD AUTO: 0.03 10*3/MM3 (ref 0–0.05)
IMM GRANULOCYTES NFR BLD AUTO: 0.4 % (ref 0–0.5)
LYMPHOCYTES # BLD AUTO: 1.66 10*3/MM3 (ref 0.7–3.1)
LYMPHOCYTES NFR BLD AUTO: 20.4 % (ref 19.6–45.3)
MAGNESIUM SERPL-MCNC: 1.8 MG/DL (ref 1.6–2.6)
MCH RBC QN AUTO: 31.3 PG (ref 26.6–33)
MCHC RBC AUTO-ENTMCNC: 34.4 G/DL (ref 31.5–35.7)
MCV RBC AUTO: 90.9 FL (ref 79–97)
MONOCYTES # BLD AUTO: 0.57 10*3/MM3 (ref 0.1–0.9)
MONOCYTES NFR BLD AUTO: 7 % (ref 5–12)
NEUTROPHILS NFR BLD AUTO: 5.79 10*3/MM3 (ref 1.7–7)
NEUTROPHILS NFR BLD AUTO: 71.2 % (ref 42.7–76)
NRBC BLD AUTO-RTO: 0 /100 WBC (ref 0–0.2)
PHOSPHATE SERPL-MCNC: 3.3 MG/DL (ref 2.5–4.5)
PLATELET # BLD AUTO: 385 10*3/MM3 (ref 140–450)
PMV BLD AUTO: 8.8 FL (ref 6–12)
POTASSIUM SERPL-SCNC: 3.5 MMOL/L (ref 3.5–5.2)
PROT SERPL-MCNC: 6.8 G/DL (ref 6–8.5)
RBC # BLD AUTO: 3.29 10*6/MM3 (ref 3.77–5.28)
SODIUM SERPL-SCNC: 138 MMOL/L (ref 136–145)
WBC NRBC COR # BLD: 8.13 10*3/MM3 (ref 3.4–10.8)

## 2023-02-04 PROCEDURE — 83735 ASSAY OF MAGNESIUM: CPT | Performed by: PHYSICIAN ASSISTANT

## 2023-02-04 PROCEDURE — 84100 ASSAY OF PHOSPHORUS: CPT | Performed by: INTERNAL MEDICINE

## 2023-02-04 PROCEDURE — 85025 COMPLETE CBC W/AUTO DIFF WBC: CPT | Performed by: INTERNAL MEDICINE

## 2023-02-04 PROCEDURE — 80053 COMPREHEN METABOLIC PANEL: CPT | Performed by: INTERNAL MEDICINE

## 2023-02-04 PROCEDURE — 82248 BILIRUBIN DIRECT: CPT | Performed by: PHYSICIAN ASSISTANT

## 2023-02-04 PROCEDURE — 99239 HOSP IP/OBS DSCHRG MGMT >30: CPT | Performed by: INTERNAL MEDICINE

## 2023-02-04 RX ORDER — LEVETIRACETAM 750 MG/1
750 TABLET ORAL 2 TIMES DAILY
Qty: 60 TABLET | Refills: 0 | Status: SHIPPED | OUTPATIENT
Start: 2023-02-04 | End: 2023-03-06

## 2023-02-04 RX ADMIN — Medication 10 ML: at 08:23

## 2023-02-04 RX ADMIN — LEVETIRACETAM 750 MG: 750 TABLET, FILM COATED ORAL at 08:23

## 2023-02-04 RX ADMIN — ALPRAZOLAM 0.5 MG: 0.25 TABLET ORAL at 08:23

## 2023-02-04 RX ADMIN — ESTRADIOL 1 MG: 1 TABLET ORAL at 08:23

## 2023-02-04 NOTE — PLAN OF CARE
Goal Outcome Evaluation:         Patient is forgetful at times about medication administration, VSS. Patient has no complaints or concerns at this time. Will continue to monitor.

## 2023-02-04 NOTE — DISCHARGE INSTR - APPOINTMENTS
Patient needs to make a follow up appointment with (PCP) in 1 week. [phone]  Patient needs to make a follow up appointment with (Neuro) in 2 weeks. [phone]

## 2023-02-04 NOTE — DISCHARGE SUMMARY
[facility]        HOSPITALIST  DISCHARGE SUMMARY    Patient Name: Lisa To  : 1965  MRN: [id number]    Date of Admission: 2023  Date of Discharge:  2023  Primary Care Physician: Rafita Schmidt MD    Consults     Date and Time Order Name Status Description    2023  8:29 AM Inpatient Neurology Consult General      2023  6:29 AM Inpatient Pulmonology Consult Completed     2023 11:20 PM Inpatient Hospitalist Consult      2023 11:14 PM General MD Inpatient Consult            Active and Resolved Hospital Problems:  Active Hospital Problems   No active problems to display.      Resolved Hospital Problems    Diagnosis POA   • **Altered mental status, unspecified altered mental status type [R41.82] Yes   • Seizure (HCC) [R56.9] Yes     ? Acute change in mental status, improved  ? Probable seizure activity  ? Anxiety disorder  ? Transaminitis.  Improving  ? Hypokalemia  ? Asymptomatic bacteruria  Hospital Course     Hospital Course:  Lisa To is a 57 y.o. female  with an unclear seizure history, anxiety, and hyperlipidemia presents with altered mental status and a concern for seizure.  Per family the patient started to be disoriented sometime around noon.  Additionally the reports of some difficulty walking/balance issues.  Her sister notes that at one point she became distinctly deaf and then began shaking for about 5 minutes.  There was an uncertain report of possibly foaming at the mouth.  Patient does take Xanax as an outpatient and possibly has been without it since her recent discharge from custodial.     Note: The patient presented to our ED on 2023 for reported syncope.  (See note from )  At that time, she denied nausea, vomiting, headache, abdominal pain, dysuria, hematuria, or apparently any history of seizures.  There is some confusion around this, because a nurse heard that the sister said she did have a seizure history.     The  ED reported the patient was nonverbal at the time of their exam.  When I examined the patient she could answer her name and could say she did not know when asked her where she was.  Her exam was notable for inappropriate laughing in response to questions and will be described as a twitch of her forearms bilaterally (not a tremor).  Moments after my exam, I heard nausea in the room and reassessed the patient and she appeared to be seizing with head thrown back, grunting noise, and tense tonic-clonic type movements.  The tonic-clonic features seem to resolve prior to our ability to actually get Ativan into the IV but the timing is very close.  The patient then had limited response after that but it certainly could be response to the 4 mg of Ativan.     Due to the uncertainty of her diagnosis and the recurrence of the seizures, the patient was moved to the ICU for closer monitoring and for the possibility of needing a drip.  Transferred out of the unit on February 1.     In the ED:  VSS     Potassium 3.5     ALT/AST 93/224     Alcohol: Negative  Tylenol: Negative     Urinalysis:  -Looks contaminated     U tox:  -Positive for benzodiazepines only     Imaging: Unremarkable     Cerebell: Negative for any seizure-like activity     ED consulted neurology who says he will see the patient in the a.m. patient evaluated by Dr. Pennington and [facility]  Patient moved out of the unit.  No more seizure activity since in the hospital.  EEG and MRI of the brain negative.     Interval Followup:   Vital signs stable on room air.  No seizure activity reported in the hospital.  Awake alert oriented x3.  She is still not telling me why she got into senior care.   Does not really complain of anything specific.  Wanted to go home.  Boyfriend at bedside.  HIV and acute hepatitis panel negative      DISCHARGE Follow Up Recommendations for labs and diagnostics: PCP and neurologist      Day of Discharge     Vital Signs:  Temp:  [97 °F  (36.1 °C)-98.1 °F (36.7 °C)] 97 °F (36.1 °C)  Heart Rate:  [71-98] 71  Resp:  [15-20] 16  BP: (118-147)/(60-97) 124/80    Physical Exam:     Constitutional: Lying in bed with a blanket over her head, no distress              Eyes: Pupils equal, sclerae anicteric, no conjunctival injection              HENT: NCAT, mucous membranes moist              Neck: Supple, no thyromegaly              Respiratory: Clear to auscultation bilaterally, nonlabored respirations               Cardiovascular: RRR, no m              Gastrointestinal: Positive bowel sounds, soft, nontender, nondistended              Musculoskeletal: No bilateral ankle edema, no clubbing or cyanosis to extremities              Psychiatric: Flat affect, seems to be in an unhappy mood              Neurologic: No gross focal deficits appreciated.  Alert and oriented.              Skin: No rashes        Discharge Details        Discharge Medications      New Medications      Instructions Start Date   levETIRAcetam 750 MG tablet  Commonly known as: KEPPRA   750 mg, Oral, 2 Times Daily         Continue These Medications      Instructions Start Date   ALPRAZolam 1 MG tablet  Commonly known as: XANAX   0.5 mg, Oral, 2 Times Daily PRN      AZO URINARY PAIN PO   1 tablet, Oral, 2 Times Daily PRN      cephalexin 500 MG capsule  Commonly known as: KEFLEX   500 mg, Oral, 2 Times Daily      estradiol 1 MG tablet  Commonly known as: Estrace   1 mg, Oral, Daily      loratadine 10 MG tablet  Commonly known as: CLARITIN   10 mg, Oral, Daily      ondansetron ODT 4 MG disintegrating tablet  Commonly known as: ZOFRAN-ODT   4 mg, Translingual, 2 Times Daily PRN         Stop These Medications    propranolol 10 MG tablet  Commonly known as: INDERAL            Allergies   Allergen Reactions   • Morphine Hives       Discharge Disposition:  Home or Self Care    Diet:  Diet Instructions     Advance Diet As Tolerated            Discharge Activity:   Activity Instructions     Other  Activity Instructions      Activity Instructions: As per neurology.  No driving or working with heavy equipment or high places.          CODE STATUS:  Code Status and Medical Interventions:   Ordered at: 02/01/23 0013     Code Status (Patient has no pulse and is not breathing):    CPR (Attempt to Resuscitate)     Medical Interventions (Patient has pulse or is breathing):    Full Support         No future appointments.    Additional Instructions for the Follow-ups that You Need to Schedule     Discharge Follow-up with PCP   As directed       Currently Documented PCP:    Rafita Schmidt MD    PCP Phone Number:    [phone]     Follow Up Details: 1 week         Discharge Follow-up with Specified Provider: Dr. Pennington; 2 Weeks   As directed      To: Dr. Pennington    Follow Up: 2 Weeks    Follow Up Details: Seizures started on Keppra               Pertinent  and/or Most Recent Results     PROCEDURES:   * Cannot find OR case *     LAB RESULTS:      Lab 02/04/23 0432 01/31/23  1832 01/28/23 2009   WBC 8.13 10.82* 11.28*   HEMOGLOBIN 10.3* 11.4* 10.7*   HEMATOCRIT 29.9* 32.9* 31.2*   PLATELETS 385 460* 431   NEUTROS ABS 5.79 8.67* 9.05*   IMMATURE GRANS (ABS) 0.03 0.04 0.09*   LYMPHS ABS 1.66 1.19 1.32   MONOS ABS 0.57 0.84 0.78   EOS ABS 0.05 0.04 0.01   MCV 90.9 90.9 92.6         Lab 02/04/23  0432 02/03/23  0449 02/02/23  0457 02/01/23  0047 01/31/23 1832 01/28/23 2009   SODIUM 138 137 137 138 136 137   POTASSIUM 3.5 3.4* 2.9* 3.2* 3.5 3.0*   CHLORIDE 103 102 104 103 97* 98   CO2 22.7 25.0 24.1 21.5* 25.7 18.2*   ANION GAP 12.3 10.0 8.9 13.5 13.3 20.8*   BUN 12 11 9 14 15 8   CREATININE 0.72 0.85 0.74 0.84 0.97 1.01*   EGFR 97.7 80.0 94.5 81.2 68.3 65.1   GLUCOSE 114* 122* 92 103* 111* 161*   CALCIUM 9.2 9.4 8.8 8.8 10.0 9.5   MAGNESIUM 1.8 1.8  --  2.0 2.1 1.9   PHOSPHORUS 3.3 2.7 3.2 2.8  --   --    TSH  --   --   --   --  0.462  --          Lab 02/04/23  0432 02/03/23  0449 02/02/23  0457 02/01/23  0047  01/31/23  1832 01/28/23 2009   TOTAL PROTEIN 6.8  --  6.3 6.9 8.7* 7.8   ALBUMIN 4.1 4.2 3.7 4.0 4.9 4.6   GLOBULIN 2.7  --   --  2.9 3.8 3.2   ALT (SGPT) 42*  --  55* 72* 93* 14   AST (SGOT) 30  --  75* 154* 224* 19   BILIRUBIN 0.4  --  0.4 0.4 0.6 0.3   BILIRUBIN DIRECT <0.2  --  <0.2  --   --   --    ALK PHOS 56  --  49 51 62 52         Lab 01/31/23  1832 01/28/23 2009   TROPONIN T <0.010 <0.010                 Brief Urine Lab Results  (Last result in the past 365 days)      Color   Clarity   Blood   Leuk Est   Nitrite   Protein   CREAT   Urine HCG        01/31/23 1931 Yellow   Clear   Small (1+)   Negative   Negative   100 mg/dL (2+)               Microbiology Results (last 10 days)     Procedure Component Value - Date/Time    Urine Culture - Urine, Straight Cath [[id number]]  (Normal) Collected: 01/31/23 1931    Lab Status: Final result Specimen: Urine from Straight Cath Updated: 02/01/23 2121     Urine Culture No growth          CT Head Without Contrast    Result Date: 1/31/2023  Impression:   No acute brain abnormality is seen.  Specifically, no acute intracranial hemorrhage, no acute infarct, no intracranial mass lesion, and no acute intracranial mass effect are appreciated.   Please note that portions of this note were completed with a voice recognition program.  JR ENGLE JR, MD       Electronically Signed and Approved By: JR ENGLE JR, MD on 1/31/2023 at 22:08              MRI Brain Without Contrast    Result Date: 2/1/2023  Impression:  No acute intracranial process identified.      JANINA HERNANDEZ MD       Electronically Signed and Approved By: JANINA HERNANDEZ MD on 2/01/2023 at 20:48             XR Chest 1 View    Result Date: 1/31/2023  Impression:  No active disease is seen.       ALONSO STOLL MD       Electronically Signed and Approved By: ALONSO STOLL MD on 1/31/2023 at 18:47                           Imaging Results (All)     Procedure Component Value Units Date/Time    MRI Brain  Without Contrast [[id number]] Collected: 02/01/23 2048     Updated: 02/01/23 2051    Narrative:      PROCEDURE: MRI BRAIN WO CONTRAST     COMPARISON: [facility], CT, CT HEAD WO CONTRAST, 1/31/2023, 21:04.     INDICATIONS: Altered mental status, nontraumatic (Ped 0-17y)             TECHNIQUE: A variety of imaging planes and parameters were utilized for visualization of suspected   pathology.  Images were performed without contrast.     FINDINGS:   No acute infarction, intracranial hemorrhage, or extra-axial collection is identified.  The   ventricles are normal in caliber, with no evidence of mass effect or midline shift.  The basal   cisterns appear patent.     The midline structures appear intact.  The globes orbits appear intact.  The intracranial vascular   flow voids appear patent.       Impression:       No acute intracranial process identified.               JANINA HERNANDEZ MD         Electronically Signed and Approved By: JANINA HERNANDEZ MD on 2/01/2023 at 20:48                     CT Head Without Contrast [[id number]] Collected: 01/31/23 2209     Updated: 01/31/23 2212    Narrative:      PROCEDURE: CT HEAD WO CONTRAST     COMPARISON: 10/22/2022.     INDICATIONS: AMS (ALTERED MENTAL STATUS); POSSIBLE SEIZURE.     PROTOCOL:   Standard CT imaging protocol performed.      RADIATION:   Total DLP: 955.2 mGy*cm    MA and/or KV were/was adjusted to minimize radiation dose.       TECHNIQUE: After obtaining the patient's consent, 122 CT images were obtained without non-ionic   intravenous contrast material.      DISCUSSION:     A routine nonenhanced head CT was performed.  No acute brain abnormality is identified.  No acute   intracranial hemorrhage.  No acute infarct is seen.  No acute skull fracture.  No midline shift or   acute intracranial mass effect is seen.  The ventricular size and configuration are within normal   limits.  There is mild chronic rightward nasal septal deviation.  Mild  age-indeterminate mucosal   thickening involves the imaged paranasal sinuses.  No air-fluid interfaces are seen within the   imaged paranasal sinuses.  There is slight motion artifact on the study.  Benign external auditory   canal debris is suspected.  No acute orbital findings are seen.       Impression:         No acute brain abnormality is seen.  Specifically, no acute intracranial hemorrhage, no acute   infarct, no intracranial mass lesion, and no acute intracranial mass effect are appreciated.        Please note that portions of this note were completed with a voice recognition program.     JR ENGLE JR, MD         Electronically Signed and Approved By: JR ENGLE JR, MD on 1/31/2023 at 22:08                        XR Chest 1 View [[id number]] Collected: 01/31/23 1847     Updated: 01/31/23 1851    Narrative:      PROCEDURE: XR CHEST 1 VW     COMPARISON: [facility], , XR CHEST 1 VW, 3/04/2022, 17:22.     INDICATIONS: Weak/Dizzy/AMS triage protocol     FINDINGS:   The heart is normal in size.  The lungs are well-expanded and free of infiltrates.     Bony structures appear intact.       Impression:       No active disease is seen.                  ALONSO STOLL MD         Electronically Signed and Approved By: ALONSO STOLL MD on 1/31/2023 at 18:47                            Labs Pending at Discharge:          Time spent on Discharge including face to face service: More than 30 minutes  Part of this note may be an electronic transcription/translation of spoken language to printed text using the Dragon Dictation System.     TElectronically signed by Morgan Anna MD, 02/04/23, 1:35 PM EST.

## 2023-02-05 NOTE — OUTREACH NOTE
Prep Survey    Flowsheet Row Responses   [facility] facility patient discharged from? [facility]   Is LACE score < 7 ? No   Eligibility Not Eligible   What are the reasons patient is not eligible? Other   Does the patient have one of the following disease processes/diagnoses(primary or secondary)? Other   Prep survey completed? Yes          ANNY WEN - Registered Nurse

## 2023-02-08 DIAGNOSIS — N95.1 MENOPAUSE SYNDROME: ICD-10-CM

## 2023-02-08 RX ORDER — ESTRADIOL 1 MG/1
1 TABLET ORAL DAILY
Qty: 90 TABLET | Refills: 3 | OUTPATIENT
Start: 2023-02-08

## 2023-02-10 DIAGNOSIS — N95.1 MENOPAUSE SYNDROME: ICD-10-CM

## 2023-02-10 RX ORDER — ESTRADIOL 1 MG/1
1 TABLET ORAL DAILY
Qty: 90 TABLET | Refills: 3 | OUTPATIENT
Start: 2023-02-10

## 2023-06-02 ENCOUNTER — APPOINTMENT (OUTPATIENT)
Dept: CT IMAGING | Facility: HOSPITAL | Age: 58
End: 2023-06-02
Payer: MEDICAID

## 2023-06-02 ENCOUNTER — HOSPITAL ENCOUNTER (EMERGENCY)
Facility: HOSPITAL | Age: 58
Discharge: HOME | End: 2023-06-03
Attending: EMERGENCY MEDICINE
Payer: MEDICAID

## 2023-06-02 DIAGNOSIS — R94.31 ABNORMAL EKG: ICD-10-CM

## 2023-06-02 DIAGNOSIS — G40.909 RECURRENT SEIZURES: Primary | ICD-10-CM

## 2023-06-02 DIAGNOSIS — N95.1 MENOPAUSE SYNDROME: ICD-10-CM

## 2023-06-02 DIAGNOSIS — F41.9 ANXIETY: ICD-10-CM

## 2023-06-02 LAB
ALBUMIN SERPL-MCNC: 4.2 G/DL (ref 3.5–5.2)
ALBUMIN/GLOB SERPL: 1.2 G/DL
ALP SERPL-CCNC: 79 U/L (ref 39–117)
ALT SERPL W P-5'-P-CCNC: 19 U/L (ref 1–33)
AMPHET+METHAMPHET UR QL: NEGATIVE
ANION GAP SERPL CALCULATED.3IONS-SCNC: 15.5 MMOL/L (ref 5–15)
AST SERPL-CCNC: 21 U/L (ref 1–32)
BARBITURATES UR QL SCN: NEGATIVE
BASOPHILS # BLD AUTO: 0.03 10*3/MM3 (ref 0–0.2)
BASOPHILS NFR BLD AUTO: 0.3 % (ref 0–1.5)
BENZODIAZ UR QL SCN: POSITIVE
BILIRUB SERPL-MCNC: 0.2 MG/DL (ref 0–1.2)
BILIRUB UR QL STRIP: NEGATIVE
BUN SERPL-MCNC: 8 MG/DL (ref 6–20)
BUN/CREAT SERPL: 7.8 (ref 7–25)
CALCIUM SPEC-SCNC: 11.3 MG/DL (ref 8.6–10.5)
CANNABINOIDS SERPL QL: NEGATIVE
CHLORIDE SERPL-SCNC: 98 MMOL/L (ref 98–107)
CLARITY UR: CLEAR
CO2 SERPL-SCNC: 22.5 MMOL/L (ref 22–29)
COCAINE UR QL: NEGATIVE
COLOR UR: YELLOW
CREAT SERPL-MCNC: 1.02 MG/DL (ref 0.57–1)
DEPRECATED RDW RBC AUTO: 39.1 FL (ref 37–54)
EGFRCR SERPLBLD CKD-EPI 2021: 64.3 ML/MIN/1.73
EOSINOPHIL # BLD AUTO: 0.01 10*3/MM3 (ref 0–0.4)
EOSINOPHIL NFR BLD AUTO: 0.1 % (ref 0.3–6.2)
ERYTHROCYTE [DISTWIDTH] IN BLOOD BY AUTOMATED COUNT: 11.9 % (ref 12.3–15.4)
FENTANYL UR-MCNC: NEGATIVE NG/ML
GLOBULIN UR ELPH-MCNC: 3.6 GM/DL
GLUCOSE SERPL-MCNC: 132 MG/DL (ref 65–99)
GLUCOSE UR STRIP-MCNC: NEGATIVE MG/DL
HCT VFR BLD AUTO: 36.2 % (ref 34–46.6)
HGB BLD-MCNC: 12.5 G/DL (ref 12–15.9)
HGB UR QL STRIP.AUTO: NEGATIVE
HOLD SPECIMEN: NORMAL
HOLD SPECIMEN: NORMAL
IMM GRANULOCYTES # BLD AUTO: 0.01 10*3/MM3 (ref 0–0.05)
IMM GRANULOCYTES NFR BLD AUTO: 0.1 % (ref 0–0.5)
KETONES UR QL STRIP: NEGATIVE
LEUKOCYTE ESTERASE UR QL STRIP.AUTO: NEGATIVE
LYMPHOCYTES # BLD AUTO: 1.18 10*3/MM3 (ref 0.7–3.1)
LYMPHOCYTES NFR BLD AUTO: 13.1 % (ref 19.6–45.3)
MCH RBC QN AUTO: 31.3 PG (ref 26.6–33)
MCHC RBC AUTO-ENTMCNC: 34.5 G/DL (ref 31.5–35.7)
MCV RBC AUTO: 90.5 FL (ref 79–97)
METHADONE UR QL SCN: NEGATIVE
MONOCYTES # BLD AUTO: 0.42 10*3/MM3 (ref 0.1–0.9)
MONOCYTES NFR BLD AUTO: 4.7 % (ref 5–12)
NEUTROPHILS NFR BLD AUTO: 7.34 10*3/MM3 (ref 1.7–7)
NEUTROPHILS NFR BLD AUTO: 81.7 % (ref 42.7–76)
NITRITE UR QL STRIP: NEGATIVE
NRBC BLD AUTO-RTO: 0 /100 WBC (ref 0–0.2)
OPIATES UR QL: NEGATIVE
OXYCODONE UR QL SCN: NEGATIVE
PH UR STRIP.AUTO: 7.5 [PH] (ref 5–8)
PLATELET # BLD AUTO: 421 10*3/MM3 (ref 140–450)
PMV BLD AUTO: 8.4 FL (ref 6–12)
POTASSIUM SERPL-SCNC: 3.4 MMOL/L (ref 3.5–5.2)
PROT SERPL-MCNC: 7.8 G/DL (ref 6–8.5)
PROT UR QL STRIP: NEGATIVE
RBC # BLD AUTO: 4 10*6/MM3 (ref 3.77–5.28)
SODIUM SERPL-SCNC: 136 MMOL/L (ref 136–145)
SP GR UR STRIP: 1.01 (ref 1–1.03)
UROBILINOGEN UR QL STRIP: NORMAL
WBC NRBC COR # BLD: 8.99 10*3/MM3 (ref 3.4–10.8)
WHOLE BLOOD HOLD COAG: NORMAL
WHOLE BLOOD HOLD SPECIMEN: NORMAL

## 2023-06-02 PROCEDURE — 80307 DRUG TEST PRSMV CHEM ANLYZR: CPT | Performed by: EMERGENCY MEDICINE

## 2023-06-02 PROCEDURE — 84484 ASSAY OF TROPONIN QUANT: CPT | Performed by: EMERGENCY MEDICINE

## 2023-06-02 PROCEDURE — 82077 ASSAY SPEC XCP UR&BREATH IA: CPT | Performed by: EMERGENCY MEDICINE

## 2023-06-02 PROCEDURE — 70450 CT HEAD/BRAIN W/O DYE: CPT

## 2023-06-02 PROCEDURE — 80050 GENERAL HEALTH PANEL: CPT | Performed by: EMERGENCY MEDICINE

## 2023-06-02 PROCEDURE — 81003 URINALYSIS AUTO W/O SCOPE: CPT | Performed by: EMERGENCY MEDICINE

## 2023-06-02 PROCEDURE — 0 LEVETIRACETAM IN NACL 0.75% 1000 MG/100ML SOLUTION: Performed by: EMERGENCY MEDICINE

## 2023-06-02 PROCEDURE — 25010000002 ONDANSETRON PER 1 MG: Performed by: EMERGENCY MEDICINE

## 2023-06-02 PROCEDURE — 96375 TX/PRO/DX INJ NEW DRUG ADDON: CPT

## 2023-06-02 PROCEDURE — 96374 THER/PROPH/DIAG INJ IV PUSH: CPT

## 2023-06-02 PROCEDURE — 84439 ASSAY OF FREE THYROXINE: CPT | Performed by: EMERGENCY MEDICINE

## 2023-06-02 PROCEDURE — 83735 ASSAY OF MAGNESIUM: CPT | Performed by: EMERGENCY MEDICINE

## 2023-06-02 PROCEDURE — 99284 EMERGENCY DEPT VISIT MOD MDM: CPT

## 2023-06-02 RX ORDER — SODIUM CHLORIDE 0.9 % (FLUSH) 0.9 %
10 SYRINGE (ML) INJECTION AS NEEDED
Status: DISCONTINUED | OUTPATIENT
Start: 2023-06-02 | End: 2023-06-03 | Stop reason: HOSPADM

## 2023-06-02 RX ORDER — ALPRAZOLAM 1 MG/1
1 TABLET ORAL ONCE
Status: COMPLETED | OUTPATIENT
Start: 2023-06-02 | End: 2023-06-02

## 2023-06-02 RX ORDER — ONDANSETRON 2 MG/ML
4 INJECTION INTRAMUSCULAR; INTRAVENOUS ONCE
Status: COMPLETED | OUTPATIENT
Start: 2023-06-03 | End: 2023-06-02

## 2023-06-02 RX ORDER — LEVETIRACETAM 10 MG/ML
1000 INJECTION INTRAVASCULAR ONCE
Status: COMPLETED | OUTPATIENT
Start: 2023-06-02 | End: 2023-06-02

## 2023-06-02 RX ADMIN — ONDANSETRON 4 MG: 2 INJECTION INTRAMUSCULAR; INTRAVENOUS at 23:46

## 2023-06-02 RX ADMIN — LEVETIRACETAM 1000 MG: 10 INJECTION INTRAVENOUS at 22:58

## 2023-06-02 RX ADMIN — ALPRAZOLAM 1 MG: 1 TABLET ORAL at 23:40

## 2023-06-02 NOTE — ED NOTES
Had a seizure today at 3:30 and another at 6:00 has fallen a few times today, Vitals are stable and blood sugar is stable per ems.

## 2023-06-03 VITALS
BODY MASS INDEX: 18.89 KG/M2 | OXYGEN SATURATION: 97 % | HEART RATE: 81 BPM | DIASTOLIC BLOOD PRESSURE: 86 MMHG | RESPIRATION RATE: 16 BRPM | HEIGHT: 58 IN | SYSTOLIC BLOOD PRESSURE: 121 MMHG | WEIGHT: 90 LBS | TEMPERATURE: 97.8 F

## 2023-06-03 LAB
ETHANOL BLD-MCNC: <10 MG/DL (ref 0–10)
ETHANOL UR QL: <0.01 %
GEN 5 2HR TROPONIN T REFLEX: 17 NG/L
MAGNESIUM SERPL-MCNC: 1.8 MG/DL (ref 1.6–2.6)
QT INTERVAL: 433 MS
T4 FREE SERPL-MCNC: 1.05 NG/DL (ref 0.93–1.7)
TROPONIN T DELTA: -2 NG/L
TROPONIN T SERPL HS-MCNC: 19 NG/L
TSH SERPL DL<=0.05 MIU/L-ACNC: 1.1 UIU/ML (ref 0.27–4.2)

## 2023-06-03 PROCEDURE — 36415 COLL VENOUS BLD VENIPUNCTURE: CPT

## 2023-06-03 PROCEDURE — 84484 ASSAY OF TROPONIN QUANT: CPT | Performed by: EMERGENCY MEDICINE

## 2023-06-03 PROCEDURE — 93005 ELECTROCARDIOGRAM TRACING: CPT | Performed by: EMERGENCY MEDICINE

## 2023-06-03 RX ORDER — LEVETIRACETAM 750 MG/1
750 TABLET ORAL 2 TIMES DAILY
Qty: 60 TABLET | Refills: 0 | Status: SHIPPED | OUTPATIENT
Start: 2023-06-03 | End: 2023-07-03

## 2023-06-03 RX ORDER — ALPRAZOLAM 1 MG/1
0.5 TABLET ORAL 2 TIMES DAILY PRN
Qty: 6 TABLET | Refills: 0 | Status: SHIPPED | OUTPATIENT
Start: 2023-06-03

## 2023-06-03 RX ORDER — ESTRADIOL 1 MG/1
1 TABLET ORAL DAILY
Qty: 90 TABLET | Refills: 0 | Status: SHIPPED | OUTPATIENT
Start: 2023-06-03

## 2023-06-03 NOTE — DISCHARGE INSTRUCTIONS
Please return to emergency room for recurrent seizure, near passing out, passing out, chest pain, chest pressure, shortness of breath, unusual fatigue, unusual sweating, vomiting, altered mental status or any new symptoms you are concerned with
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
## 2023-06-16 ENCOUNTER — TELEPHONE (OUTPATIENT)
Dept: FAMILY MEDICINE CLINIC | Age: 58
End: 2023-06-16
Payer: COMMERCIAL

## 2023-08-17 DIAGNOSIS — R56.9 SEIZURES: ICD-10-CM

## 2023-08-17 RX ORDER — LEVETIRACETAM 750 MG/1
TABLET ORAL
Qty: 60 TABLET | Refills: 0 | Status: SHIPPED | OUTPATIENT
Start: 2023-08-17

## 2023-09-12 DIAGNOSIS — F41.9 ANXIETY: ICD-10-CM

## 2023-09-12 DIAGNOSIS — R56.9 SEIZURES: ICD-10-CM

## 2023-09-12 RX ORDER — LEVETIRACETAM 750 MG/1
TABLET ORAL
Qty: 60 TABLET | Refills: 0 | Status: SHIPPED | OUTPATIENT
Start: 2023-09-12

## 2023-09-12 RX ORDER — ALPRAZOLAM 1 MG/1
TABLET ORAL
Qty: 60 TABLET | OUTPATIENT
Start: 2023-09-12

## 2023-09-12 NOTE — TELEPHONE ENCOUNTER
Denied refill on Xanax.  Per 6/2723 notes establish care with PCP for Management for anxiety & seizures.

## 2023-10-14 DIAGNOSIS — M62.838 MUSCLE SPASM: ICD-10-CM

## 2023-10-16 RX ORDER — CYCLOBENZAPRINE HCL 10 MG
10 TABLET ORAL 2 TIMES DAILY PRN
Qty: 30 TABLET | Refills: 1 | Status: SHIPPED | OUTPATIENT
Start: 2023-10-16

## 2024-01-19 ENCOUNTER — HOSPITAL ENCOUNTER (EMERGENCY)
Facility: HOSPITAL | Age: 59
Discharge: HOME | End: 2024-01-19
Attending: EMERGENCY MEDICINE
Payer: MEDICAID

## 2024-01-19 VITALS
DIASTOLIC BLOOD PRESSURE: 92 MMHG | RESPIRATION RATE: 18 BRPM | TEMPERATURE: 97.6 F | WEIGHT: 100 LBS | HEART RATE: 91 BPM | BODY MASS INDEX: 20.99 KG/M2 | OXYGEN SATURATION: 95 % | HEIGHT: 58 IN | SYSTOLIC BLOOD PRESSURE: 139 MMHG

## 2024-01-19 DIAGNOSIS — F41.9 ANXIETY: Primary | ICD-10-CM

## 2024-01-19 LAB
ALBUMIN SERPL-MCNC: 4.5 G/DL (ref 3.5–5.2)
ALBUMIN/GLOB SERPL: 1.3 G/DL
ALP SERPL-CCNC: 53 U/L (ref 39–117)
ALT SERPL W P-5'-P-CCNC: 13 U/L (ref 1–33)
AMPHET+METHAMPHET UR QL: NEGATIVE
ANION GAP SERPL CALCULATED.3IONS-SCNC: 13.3 MMOL/L (ref 5–15)
AST SERPL-CCNC: 28 U/L (ref 1–32)
BARBITURATES UR QL SCN: NEGATIVE
BASOPHILS # BLD AUTO: 0.04 10*3/MM3 (ref 0–0.2)
BASOPHILS NFR BLD AUTO: 0.5 % (ref 0–1.5)
BENZODIAZ UR QL SCN: NEGATIVE
BILIRUB SERPL-MCNC: 0.2 MG/DL (ref 0–1.2)
BUN SERPL-MCNC: 22 MG/DL (ref 6–20)
BUN/CREAT SERPL: 19.3 (ref 7–25)
CALCIUM SPEC-SCNC: 10.1 MG/DL (ref 8.6–10.5)
CANNABINOIDS SERPL QL: NEGATIVE
CHLORIDE SERPL-SCNC: 99 MMOL/L (ref 98–107)
CO2 SERPL-SCNC: 23.7 MMOL/L (ref 22–29)
COCAINE UR QL: NEGATIVE
CREAT SERPL-MCNC: 1.14 MG/DL (ref 0.57–1)
DEPRECATED RDW RBC AUTO: 41.4 FL (ref 37–54)
EGFRCR SERPLBLD CKD-EPI 2021: 55.9 ML/MIN/1.73
EOSINOPHIL # BLD AUTO: 0.03 10*3/MM3 (ref 0–0.4)
EOSINOPHIL NFR BLD AUTO: 0.4 % (ref 0.3–6.2)
ERYTHROCYTE [DISTWIDTH] IN BLOOD BY AUTOMATED COUNT: 12.3 % (ref 12.3–15.4)
ETHANOL BLD-MCNC: <10 MG/DL (ref 0–10)
ETHANOL UR QL: <0.01 %
FENTANYL UR-MCNC: NEGATIVE NG/ML
GLOBULIN UR ELPH-MCNC: 3.4 GM/DL
GLUCOSE SERPL-MCNC: 141 MG/DL (ref 65–99)
HCT VFR BLD AUTO: 36.5 % (ref 34–46.6)
HGB BLD-MCNC: 11.8 G/DL (ref 12–15.9)
HOLD SPECIMEN: NORMAL
HOLD SPECIMEN: NORMAL
IMM GRANULOCYTES # BLD AUTO: 0.02 10*3/MM3 (ref 0–0.05)
IMM GRANULOCYTES NFR BLD AUTO: 0.3 % (ref 0–0.5)
LYMPHOCYTES # BLD AUTO: 0.97 10*3/MM3 (ref 0.7–3.1)
LYMPHOCYTES NFR BLD AUTO: 12.2 % (ref 19.6–45.3)
MCH RBC QN AUTO: 29.9 PG (ref 26.6–33)
MCHC RBC AUTO-ENTMCNC: 32.3 G/DL (ref 31.5–35.7)
MCV RBC AUTO: 92.4 FL (ref 79–97)
METHADONE UR QL SCN: NEGATIVE
MONOCYTES # BLD AUTO: 0.39 10*3/MM3 (ref 0.1–0.9)
MONOCYTES NFR BLD AUTO: 4.9 % (ref 5–12)
NEUTROPHILS NFR BLD AUTO: 6.51 10*3/MM3 (ref 1.7–7)
NEUTROPHILS NFR BLD AUTO: 81.7 % (ref 42.7–76)
NRBC BLD AUTO-RTO: 0 /100 WBC (ref 0–0.2)
OPIATES UR QL: NEGATIVE
OXYCODONE UR QL SCN: NEGATIVE
PLATELET # BLD AUTO: 366 10*3/MM3 (ref 140–450)
PMV BLD AUTO: 9.5 FL (ref 6–12)
POTASSIUM SERPL-SCNC: 4.2 MMOL/L (ref 3.5–5.2)
PROT SERPL-MCNC: 7.9 G/DL (ref 6–8.5)
RBC # BLD AUTO: 3.95 10*6/MM3 (ref 3.77–5.28)
SODIUM SERPL-SCNC: 136 MMOL/L (ref 136–145)
WBC NRBC COR # BLD AUTO: 7.96 10*3/MM3 (ref 3.4–10.8)
WHOLE BLOOD HOLD COAG: NORMAL
WHOLE BLOOD HOLD SPECIMEN: NORMAL

## 2024-01-19 PROCEDURE — 80307 DRUG TEST PRSMV CHEM ANLYZR: CPT | Performed by: EMERGENCY MEDICINE

## 2024-01-19 PROCEDURE — 82077 ASSAY SPEC XCP UR&BREATH IA: CPT | Performed by: EMERGENCY MEDICINE

## 2024-01-19 PROCEDURE — 99283 EMERGENCY DEPT VISIT LOW MDM: CPT

## 2024-01-19 PROCEDURE — 85025 COMPLETE CBC W/AUTO DIFF WBC: CPT | Performed by: EMERGENCY MEDICINE

## 2024-01-19 PROCEDURE — 36415 COLL VENOUS BLD VENIPUNCTURE: CPT

## 2024-01-19 PROCEDURE — 80053 COMPREHEN METABOLIC PANEL: CPT | Performed by: EMERGENCY MEDICINE

## 2024-01-19 RX ORDER — MIRTAZAPINE 15 MG/1
15 TABLET, FILM COATED ORAL
COMMUNITY
Start: 2024-01-08

## 2024-01-19 RX ORDER — HYDROXYZINE HYDROCHLORIDE 25 MG/1
50 TABLET, FILM COATED ORAL ONCE
Status: COMPLETED | OUTPATIENT
Start: 2024-01-19 | End: 2024-01-19

## 2024-01-19 RX ORDER — CITALOPRAM HYDROBROMIDE 20 MG
20 TABLET ORAL
COMMUNITY
Start: 2024-01-08

## 2024-01-19 RX ORDER — HYDROXYZINE PAMOATE 50 MG/1
50 CAPSULE ORAL 3 TIMES DAILY PRN
Qty: 30 CAPSULE | Refills: 0 | Status: SHIPPED | OUTPATIENT
Start: 2024-01-19

## 2024-01-19 RX ADMIN — HYDROXYZINE HYDROCHLORIDE 50 MG: 25 TABLET, FILM COATED ORAL at 14:52

## 2024-01-19 NOTE — ED PROVIDER NOTES
Time: 3:41 PM EST  Date of encounter:  2024  Independent Historian/Clinical History and Information was obtained by:   Patient    History is limited by: N/A      Chief Complaint: Anxiety/withdrawal      History of Present Illness:  Patient is a 58 y.o. year old female who presents to the emergency department for evaluation of anxiety and withdrawal from opioids.  Patient reports long-term opioid and alprazolam use but reports she has not used anything for couple months and now reports withdrawal from that.    HPI    Patient Care Team  Primary Care Provider: Bret Mariee MD    Past Medical History:     Allergies   Allergen Reactions    Morphine Hives    Sulfa Antibiotics Hives     Past Medical History:   Diagnosis Date    Allergic rhinitis     Anxiety     Hyperlipidemia 2021    Pain, lumbar region     Seizures     pt unsure, only one episode    Trauma      Past Surgical History:   Procedure Laterality Date    BREAST IMPLANT SURGERY Bilateral      SECTION      COLONOSCOPY  2017    Dr. Clarke    HYSTERECTOMY       Family History   Problem Relation Age of Onset    Cancer Other     Heart disease Other         Cerebrovascular disease    Diabetes Other     Colon cancer Other         Uncle    Breast cancer Neg Hx     Uterine cancer Neg Hx     Ovarian cancer Neg Hx     Deep vein thrombosis Neg Hx     Pulmonary embolism Neg Hx        Home Medications:  Prior to Admission medications    Medication Sig Start Date End Date Taking? Authorizing Provider   CeleXA 20 MG tablet 1 tablet. 24  Yes Marina Bruce MD   doxepin (SINEquan) 50 MG capsule Take 1 capsule by mouth every night at bedtime. 11/10/23  Yes Marina Bruce MD   estradiol (Estrace) 1 MG tablet Take 1 tablet by mouth Daily. 23  Yes Gail Shetty APRN   Remeron 15 MG tablet 1 tablet. 24  Yes Marina Bruce MD   ALPRAZolam (XANAX) 1 MG tablet Take 0.5 tablets by mouth 2 (Two) Times a Day As Needed for  Anxiety.  Patient not taking: Reported on 1/19/2024 6/3/23   Reji Gilbert DO   azithromycin (Zithromax Z-Edil) 250 MG tablet Take 2 tablets by mouth on day 1, then 1 tablet daily on days 2-5  Patient not taking: Reported on 7/12/2023 7/5/23   Bret Mariee MD   busPIRone (BUSPAR) 5 MG tablet Take 1 tablet by mouth 3 (Three) Times a Day.  Patient not taking: Reported on 1/19/2024 7/12/23   Bret Mariee MD   cyclobenzaprine (FLEXERIL) 10 MG tablet TAKE 1 TABLET BY MOUTH 2 TIMES A DAY AS NEEDED FOR MUSCLE SPASMS.  Patient not taking: Reported on 1/19/2024 10/16/23   Bret Mariee MD   hydrOXYzine pamoate (VISTARIL) 50 MG capsule Take 1 capsule by mouth 3 (Three) Times a Day As Needed for Itching. 1/19/24   Preet Justice MD   levETIRAcetam (KEPPRA) 750 MG tablet TAKE 1 TABLET BY MOUTH 2 TIMES A DAY  Patient not taking: Reported on 1/19/2024 9/12/23   Bret Mariee MD   loratadine (CLARITIN) 10 MG tablet Take 1 tablet by mouth Daily.  Patient not taking: Reported on 1/19/2024    ProviderMarina MD   traZODone (DESYREL) 50 MG tablet Take 1 tablet by mouth Every Night. 7/12/23 1/19/24  Bret Mariee MD        Social History:   Social History     Tobacco Use    Smoking status: Never    Smokeless tobacco: Never   Vaping Use    Vaping Use: Never used   Substance Use Topics    Alcohol use: Not Currently     Comment: Quit drinking at 14    Drug use: Not Currently     Comment: HX of use of xanax         Review of Systems:  Review of Systems   Constitutional:  Negative for chills and fever.   HENT:  Negative for congestion, rhinorrhea and sore throat.    Eyes:  Negative for pain and visual disturbance.   Respiratory:  Negative for apnea, cough, chest tightness and shortness of breath.    Cardiovascular:  Negative for chest pain and palpitations.   Gastrointestinal:  Negative for abdominal pain, diarrhea, nausea and vomiting.   Genitourinary:  Negative for difficulty urinating and dysuria.   Musculoskeletal:  Negative for  "joint swelling and myalgias.   Skin:  Negative for color change.   Neurological:  Negative for seizures and headaches.   Psychiatric/Behavioral: Negative.     All other systems reviewed and are negative.       Physical Exam:  /92 (BP Location: Left arm, Patient Position: Sitting)   Pulse 91   Temp 97.6 °F (36.4 °C) (Oral)   Resp 18   Ht 147.3 cm (58\")   Wt 45.4 kg (100 lb)   SpO2 95%   BMI 20.90 kg/m²     Physical Exam  Vitals and nursing note reviewed.   Constitutional:       General: She is not in acute distress.     Appearance: Normal appearance. She is not toxic-appearing.   HENT:      Head: Normocephalic and atraumatic.      Jaw: There is normal jaw occlusion.   Eyes:      General: Lids are normal.      Extraocular Movements: Extraocular movements intact.      Conjunctiva/sclera: Conjunctivae normal.      Pupils: Pupils are equal, round, and reactive to light.   Cardiovascular:      Rate and Rhythm: Normal rate and regular rhythm.      Pulses: Normal pulses.      Heart sounds: Normal heart sounds.   Pulmonary:      Effort: Pulmonary effort is normal. No respiratory distress.      Breath sounds: Normal breath sounds. No wheezing or rhonchi.   Abdominal:      General: Abdomen is flat.      Palpations: Abdomen is soft.      Tenderness: There is no abdominal tenderness. There is no guarding or rebound.   Musculoskeletal:         General: Normal range of motion.      Cervical back: Normal range of motion and neck supple.      Right lower leg: No edema.      Left lower leg: No edema.   Skin:     General: Skin is warm and dry.   Neurological:      Mental Status: She is alert and oriented to person, place, and time. Mental status is at baseline.   Psychiatric:         Mood and Affect: Mood normal.                  Procedures:  Procedures      Medical Decision Making:      Comorbidities that affect care:    Substance Abuse    External Notes reviewed:    None      The following orders were placed and all " results were independently analyzed by me:  Orders Placed This Encounter   Procedures    [place] Draw    Ethanol    Urine Drug Screen - Urine, Clean Catch    Comprehensive Metabolic Panel    CBC Auto Differential    NPO Diet NPO Type: Strict NPO    Psych / Access to See    Green Top (Gel)    Lavender Top    Gold Top - SST    Light Blue Top    CBC & Differential       Medications Given in the Emergency Department:  Medications   hydrOXYzine (ATARAX) tablet 50 mg (50 mg Oral Given 1/19/24 1452)        ED Course:         Labs:    Lab Results (last 24 hours)       Procedure Component Value Units Date/Time    Ethanol [[id number]] Collected: 01/19/24 1122    Specimen: Blood Updated: 01/19/24 1148     Ethanol <10 mg/dL      Ethanol % <0.010 %     Narrative:      Ethanol (Plasma)  <10 Essentially Negative    Toxic Concentrations           mg/dL    Flushing, slowing of reflexes    Impaired visual activity         Depression of CNS              >100  Possible Coma                  >300       CBC & Differential [[id number]]  (Abnormal) Collected: 01/19/24 1122    Specimen: Blood Updated: 01/19/24 1152    Narrative:      The following orders were created for panel order CBC & Differential.  Procedure                               Abnormality         Status                     ---------                               -----------         ------                     CBC Auto Differential[[id number]]        Abnormal            Final result                 Please view results for these tests on the individual orders.    Comprehensive Metabolic Panel [[id number]]  (Abnormal) Collected: 01/19/24 1122    Specimen: Blood Updated: 01/19/24 1214     Glucose 141 mg/dL      BUN 22 mg/dL      Creatinine 1.14 mg/dL      Sodium 136 mmol/L      Potassium 4.2 mmol/L      Comment: Slight hemolysis detected by analyzer. Result may be falsely elevated.        Chloride 99 mmol/L      CO2 23.7 mmol/L      Calcium 10.1 mg/dL      Total Protein  7.9 g/dL      Albumin 4.5 g/dL      ALT (SGPT) 13 U/L      AST (SGOT) 28 U/L      Comment: Slight hemolysis detected by analyzer. Result may be falsely elevated.        Alkaline Phosphatase 53 U/L      Total Bilirubin 0.2 mg/dL      Globulin 3.4 gm/dL      A/G Ratio 1.3 g/dL      BUN/Creatinine Ratio 19.3     Anion Gap 13.3 mmol/L      eGFR 55.9 mL/min/1.73     Narrative:      GFR Normal >60  Chronic Kidney Disease <60  Kidney Failure <15      CBC Auto Differential [[id number]]  (Abnormal) Collected: 01/19/24 1122    Specimen: Blood Updated: 01/19/24 1152     WBC 7.96 10*3/mm3      RBC 3.95 10*6/mm3      Hemoglobin 11.8 g/dL      Hematocrit 36.5 %      MCV 92.4 fL      MCH 29.9 pg      MCHC 32.3 g/dL      RDW 12.3 %      RDW-SD 41.4 fl      MPV 9.5 fL      Platelets 366 10*3/mm3      Neutrophil % 81.7 %      Lymphocyte % 12.2 %      Monocyte % 4.9 %      Eosinophil % 0.4 %      Basophil % 0.5 %      Immature Grans % 0.3 %      Neutrophils, Absolute 6.51 10*3/mm3      Lymphocytes, Absolute 0.97 10*3/mm3      Monocytes, Absolute 0.39 10*3/mm3      Eosinophils, Absolute 0.03 10*3/mm3      Basophils, Absolute 0.04 10*3/mm3      Immature Grans, Absolute 0.02 10*3/mm3      nRBC 0.0 /100 WBC     Urine Drug Screen - Urine, Clean Catch [[id number]]  (Normal) Collected: 01/19/24 1400    Specimen: Urine, Clean Catch Updated: 01/19/24 1430     Amphet/Methamphet, Screen Negative     Barbiturates Screen, Urine Negative     Benzodiazepine Screen, Urine Negative     Cocaine Screen, Urine Negative     Opiate Screen Negative     THC, Screen, Urine Negative     Methadone Screen, Urine Negative     Oxycodone Screen, Urine Negative     Fentanyl, Urine Negative    Narrative:      Negative Thresholds Per Drugs Screened:    Amphetamines                 500 ng/ml  Barbiturates                 200 ng/ml  Benzodiazepines              100 ng/ml  Cocaine                      300 ng/ml  Methadone                    300 ng/ml  Opiates                       300 ng/ml  Oxycodone                    100 ng/ml  THC                           50 ng/ml  Fentanyl                       5 ng/ml      The Normal Value for all drugs tested is negative. This report includes final unconfirmed screening results to be used for medical treatment purposes only. Unconfirmed results must not be used for non-medical purposes such as employment or legal testing. Clinical consideration should be applied to any drug of abuse test, particularly when unconfirmed results are used.                     Imaging:    No Radiology Exams Resulted Within Past 24 Hours      Differential Diagnosis and Discussion:    Psychiatric: Differential diagnosis includes but is not limited to depression, psychosis, bipolar disorder, anxiety, manic episode, schizophrenia, and substance abuse.    All labs were reviewed and interpreted by me.    MDM  Number of Diagnoses or Management Options  Anxiety  Diagnosis management comments: In summary this is a 58-year-old female who presents to the emerged department for evaluation of anxiety/withdrawal.  Patient reports she has not used any of these medications a couple of months therefore I doubt that she is having any actual withdrawal symptoms.  CBC independently reviewed by me and shows no critical abnormalities.  CMP independently reviewed by me and shows no critical abnormalities.  Urine drug screen negative.  Patient was given hydroxyzine in the emergency department will be discharged home with prescription for hydroxyzine.  Very strict return to ER and follow-up instructions have been provided to the patient.                   Patient Care Considerations:    PSYCH: I considered ordering anxiolytic and or antipsychotic medications, however patient was able to facilitate the medical screening exam and disposition without further medications.      Consultants/Shared Management Plan:    None    Social Determinants of Health:    Patient is independent, reliable, and  has access to care.       Disposition and Care Coordination:    Discharged: The patient is suitable and stable for discharge with no need for consideration of admission.    I have explained the patient´s condition, diagnoses and treatment plan based on the information available to me at this time. I have answered questions and addressed any concerns. The patient has a good  understanding of the patient´s diagnosis, condition, and treatment plan as can be expected at this point. The vital signs have been stable. The patient´s condition is stable and appropriate for discharge from the emergency department.      The patient will pursue further outpatient evaluation with the primary care physician or other designated or consulting physician as outlined in the discharge instructions. They are agreeable to this plan of care and follow-up instructions have been explained in detail. The patient has received these instructions in written format and have expressed an understanding of the discharge instructions. The patient is aware that any significant change in condition or worsening of symptoms should prompt an immediate return to this or the closest emergency department or call to 911.  I have explained discharge medications and the need for follow up with the patient/caretakers. This was also printed in the discharge instructions. Patient was discharged with the following medications and follow up:      Medication List        New Prescriptions      hydrOXYzine pamoate 50 MG capsule  Commonly known as: VISTARIL  Take 1 capsule by mouth 3 (Three) Times a Day As Needed for Itching.               Where to Get Your Medications        These medications were sent to [facility] - [place] - [address] - [phone]  - [phone] FX  [address], [place] [address]      Phone: [phone]   hydrOXYzine pamoate 50 MG capsule      Bret Mariee MD  [address]  [place]  [address]  [phone]    In 1 week         Final diagnoses:   Anxiety        ED Disposition       ED Disposition   Discharge    Condition   Stable    Comment   --               This medical record created using voice recognition software.             Preet Justice MD  01/19/24 2854

## 2024-05-06 ENCOUNTER — HOSPITAL ENCOUNTER (EMERGENCY)
Facility: HOSPITAL | Age: 59
Discharge: PSYCHIATRIC OR SUBSTANCE USE FACILITY | DRG: 885 | End: 2024-05-07
Attending: EMERGENCY MEDICINE | Admitting: FAMILY MEDICINE
Payer: MEDICAID

## 2024-05-06 DIAGNOSIS — F29 PSYCHOSIS, UNSPECIFIED PSYCHOSIS TYPE: Primary | ICD-10-CM

## 2024-05-06 PROCEDURE — 99285 EMERGENCY DEPT VISIT HI MDM: CPT

## 2024-05-07 ENCOUNTER — PREP FOR SURGERY (OUTPATIENT)
Dept: OTHER | Facility: HOSPITAL | Age: 59
End: 2024-05-07
Payer: MEDICAID

## 2024-05-07 ENCOUNTER — APPOINTMENT (OUTPATIENT)
Dept: CT IMAGING | Facility: HOSPITAL | Age: 59
DRG: 885 | End: 2024-05-07
Payer: MEDICAID

## 2024-05-07 ENCOUNTER — HOSPITAL ENCOUNTER (INPATIENT)
Facility: HOSPITAL | Age: 59
LOS: 2 days | Discharge: HOME | DRG: 885 | End: 2024-05-09
Attending: PSYCHIATRY & NEUROLOGY | Admitting: PSYCHIATRY & NEUROLOGY
Payer: MEDICAID

## 2024-05-07 VITALS
HEIGHT: 58 IN | BODY MASS INDEX: 23.97 KG/M2 | WEIGHT: 114.2 LBS | DIASTOLIC BLOOD PRESSURE: 64 MMHG | SYSTOLIC BLOOD PRESSURE: 122 MMHG | OXYGEN SATURATION: 99 % | RESPIRATION RATE: 14 BRPM | HEART RATE: 101 BPM | TEMPERATURE: 97.9 F

## 2024-05-07 DIAGNOSIS — F29 PSYCHOSIS: ICD-10-CM

## 2024-05-07 DIAGNOSIS — F29 PSYCHOSIS: Primary | ICD-10-CM

## 2024-05-07 LAB
ALBUMIN SERPL-MCNC: 4.7 G/DL (ref 3.5–5.2)
ALBUMIN/GLOB SERPL: 1.3 G/DL
ALP SERPL-CCNC: 64 U/L (ref 39–117)
ALT SERPL W P-5'-P-CCNC: 9 U/L (ref 1–33)
AMPHET+METHAMPHET UR QL: NEGATIVE
ANION GAP SERPL CALCULATED.3IONS-SCNC: 18.5 MMOL/L (ref 5–15)
APAP SERPL-MCNC: <5 MCG/ML (ref 0–30)
AST SERPL-CCNC: 19 U/L (ref 1–32)
BARBITURATES UR QL SCN: NEGATIVE
BASOPHILS # BLD AUTO: 0.03 10*3/MM3 (ref 0–0.2)
BASOPHILS NFR BLD AUTO: 0.3 % (ref 0–1.5)
BENZODIAZ UR QL SCN: POSITIVE
BILIRUB SERPL-MCNC: 0.2 MG/DL (ref 0–1.2)
BUN SERPL-MCNC: 17 MG/DL (ref 6–20)
BUN/CREAT SERPL: 17.5 (ref 7–25)
CALCIUM SPEC-SCNC: 10 MG/DL (ref 8.6–10.5)
CANNABINOIDS SERPL QL: NEGATIVE
CHLORIDE SERPL-SCNC: 101 MMOL/L (ref 98–107)
CK SERPL-CCNC: 75 U/L (ref 20–180)
CO2 SERPL-SCNC: 19.5 MMOL/L (ref 22–29)
COCAINE UR QL: NEGATIVE
CREAT SERPL-MCNC: 0.97 MG/DL (ref 0.57–1)
DEPRECATED RDW RBC AUTO: 43.1 FL (ref 37–54)
EGFRCR SERPLBLD CKD-EPI 2021: 67.9 ML/MIN/1.73
EOSINOPHIL # BLD AUTO: 0.02 10*3/MM3 (ref 0–0.4)
EOSINOPHIL NFR BLD AUTO: 0.2 % (ref 0.3–6.2)
ERYTHROCYTE [DISTWIDTH] IN BLOOD BY AUTOMATED COUNT: 13.2 % (ref 12.3–15.4)
ETHANOL BLD-MCNC: <10 MG/DL (ref 0–10)
ETHANOL UR QL: <0.01 %
FENTANYL UR-MCNC: POSITIVE NG/ML
GLOBULIN UR ELPH-MCNC: 3.5 GM/DL
GLUCOSE SERPL-MCNC: 89 MG/DL (ref 65–99)
HCT VFR BLD AUTO: 35.5 % (ref 34–46.6)
HGB BLD-MCNC: 11.8 G/DL (ref 12–15.9)
HOLD SPECIMEN: NORMAL
HOLD SPECIMEN: NORMAL
IMM GRANULOCYTES # BLD AUTO: 0.03 10*3/MM3 (ref 0–0.05)
IMM GRANULOCYTES NFR BLD AUTO: 0.3 % (ref 0–0.5)
LYMPHOCYTES # BLD AUTO: 1.54 10*3/MM3 (ref 0.7–3.1)
LYMPHOCYTES NFR BLD AUTO: 17.1 % (ref 19.6–45.3)
MCH RBC QN AUTO: 29.8 PG (ref 26.6–33)
MCHC RBC AUTO-ENTMCNC: 33.2 G/DL (ref 31.5–35.7)
MCV RBC AUTO: 89.6 FL (ref 79–97)
METHADONE UR QL SCN: NEGATIVE
MONOCYTES # BLD AUTO: 0.56 10*3/MM3 (ref 0.1–0.9)
MONOCYTES NFR BLD AUTO: 6.2 % (ref 5–12)
NEUTROPHILS NFR BLD AUTO: 6.82 10*3/MM3 (ref 1.7–7)
NEUTROPHILS NFR BLD AUTO: 75.9 % (ref 42.7–76)
NRBC BLD AUTO-RTO: 0 /100 WBC (ref 0–0.2)
OPIATES UR QL: NEGATIVE
OXYCODONE UR QL SCN: NEGATIVE
PLATELET # BLD AUTO: 377 10*3/MM3 (ref 140–450)
PMV BLD AUTO: 9 FL (ref 6–12)
POTASSIUM SERPL-SCNC: 3.9 MMOL/L (ref 3.5–5.2)
PROT SERPL-MCNC: 8.2 G/DL (ref 6–8.5)
RBC # BLD AUTO: 3.96 10*6/MM3 (ref 3.77–5.28)
SALICYLATES SERPL-MCNC: <0.3 MG/DL
SODIUM SERPL-SCNC: 139 MMOL/L (ref 136–145)
T4 FREE SERPL-MCNC: 1.26 NG/DL (ref 0.92–1.68)
TSH SERPL DL<=0.05 MIU/L-ACNC: 0.33 UIU/ML (ref 0.27–4.2)
WBC NRBC COR # BLD AUTO: 9 10*3/MM3 (ref 3.4–10.8)
WHOLE BLOOD HOLD COAG: NORMAL
WHOLE BLOOD HOLD SPECIMEN: NORMAL

## 2024-05-07 PROCEDURE — 80307 DRUG TEST PRSMV CHEM ANLYZR: CPT | Performed by: EMERGENCY MEDICINE

## 2024-05-07 PROCEDURE — 80050 GENERAL HEALTH PANEL: CPT | Performed by: EMERGENCY MEDICINE

## 2024-05-07 PROCEDURE — 96372 THER/PROPH/DIAG INJ SC/IM: CPT

## 2024-05-07 PROCEDURE — 70450 CT HEAD/BRAIN W/O DYE: CPT

## 2024-05-07 PROCEDURE — 25010000002 LORAZEPAM PER 2 MG: Performed by: EMERGENCY MEDICINE

## 2024-05-07 PROCEDURE — 82550 ASSAY OF CK (CPK): CPT | Performed by: EMERGENCY MEDICINE

## 2024-05-07 PROCEDURE — 82077 ASSAY SPEC XCP UR&BREATH IA: CPT | Performed by: EMERGENCY MEDICINE

## 2024-05-07 PROCEDURE — 80143 DRUG ASSAY ACETAMINOPHEN: CPT | Performed by: EMERGENCY MEDICINE

## 2024-05-07 PROCEDURE — 84439 ASSAY OF FREE THYROXINE: CPT | Performed by: EMERGENCY MEDICINE

## 2024-05-07 PROCEDURE — 25010000002 ZIPRASIDONE MESYLATE PER 10 MG: Performed by: EMERGENCY MEDICINE

## 2024-05-07 PROCEDURE — P9612 CATHETERIZE FOR URINE SPEC: HCPCS

## 2024-05-07 PROCEDURE — 36415 COLL VENOUS BLD VENIPUNCTURE: CPT

## 2024-05-07 PROCEDURE — 80179 DRUG ASSAY SALICYLATE: CPT | Performed by: EMERGENCY MEDICINE

## 2024-05-07 RX ORDER — LOPERAMIDE HYDROCHLORIDE 2 MG/1
2 CAPSULE ORAL
Status: DISCONTINUED | OUTPATIENT
Start: 2024-05-07 | End: 2024-05-09 | Stop reason: HOSPADM

## 2024-05-07 RX ORDER — LEVETIRACETAM 750 MG/1
750 TABLET ORAL 2 TIMES DAILY
Status: CANCELLED | OUTPATIENT
Start: 2024-05-07

## 2024-05-07 RX ORDER — LEVETIRACETAM 500 MG/1
750 TABLET ORAL 2 TIMES DAILY
Status: DISCONTINUED | OUTPATIENT
Start: 2024-05-07 | End: 2024-05-09 | Stop reason: HOSPADM

## 2024-05-07 RX ORDER — LEVETIRACETAM 10 MG/ML
1000 INJECTION INTRAVASCULAR ONCE
Status: DISCONTINUED | OUTPATIENT
Start: 2024-05-07 | End: 2024-05-07 | Stop reason: HOSPADM

## 2024-05-07 RX ORDER — BUSPIRONE HYDROCHLORIDE 15 MG/1
1 TABLET ORAL 3 TIMES DAILY
COMMUNITY
Start: 2024-04-10 | End: 2024-05-09 | Stop reason: HOSPADM

## 2024-05-07 RX ORDER — LORAZEPAM 2 MG/1
2 TABLET ORAL EVERY 4 HOURS PRN
Status: DISCONTINUED | OUTPATIENT
Start: 2024-05-07 | End: 2024-05-09 | Stop reason: HOSPADM

## 2024-05-07 RX ORDER — RISPERIDONE 2 MG/1
2 TABLET ORAL 2 TIMES DAILY
Status: DISCONTINUED | OUTPATIENT
Start: 2024-05-07 | End: 2024-05-09 | Stop reason: HOSPADM

## 2024-05-07 RX ORDER — HALOPERIDOL 5 MG/1
5 TABLET ORAL EVERY 4 HOURS PRN
Status: CANCELLED | OUTPATIENT
Start: 2024-05-07

## 2024-05-07 RX ORDER — LORAZEPAM 2 MG/ML
2 INJECTION INTRAMUSCULAR ONCE
Status: DISCONTINUED | OUTPATIENT
Start: 2024-05-07 | End: 2024-05-07

## 2024-05-07 RX ORDER — HALOPERIDOL 5 MG/1
5 TABLET ORAL EVERY 4 HOURS PRN
Status: DISCONTINUED | OUTPATIENT
Start: 2024-05-07 | End: 2024-05-09 | Stop reason: HOSPADM

## 2024-05-07 RX ORDER — NICOTINE 21 MG/24HR
1 PATCH, TRANSDERMAL 24 HOURS TRANSDERMAL DAILY PRN
Status: CANCELLED | OUTPATIENT
Start: 2024-05-07

## 2024-05-07 RX ORDER — MIRTAZAPINE 15 MG/1
15 TABLET, FILM COATED ORAL NIGHTLY
COMMUNITY
End: 2024-05-09 | Stop reason: HOSPADM

## 2024-05-07 RX ORDER — DIPHENHYDRAMINE HCL 50 MG
50 CAPSULE ORAL EVERY 4 HOURS PRN
Status: CANCELLED | OUTPATIENT
Start: 2024-05-07

## 2024-05-07 RX ORDER — HYDROXYZINE PAMOATE 50 MG/1
50 CAPSULE ORAL EVERY 6 HOURS PRN
Status: DISCONTINUED | OUTPATIENT
Start: 2024-05-07 | End: 2024-05-09 | Stop reason: HOSPADM

## 2024-05-07 RX ORDER — HYDROXYZINE PAMOATE 50 MG/1
50 CAPSULE ORAL EVERY 6 HOURS PRN
Status: CANCELLED | OUTPATIENT
Start: 2024-05-07

## 2024-05-07 RX ORDER — LORAZEPAM 2 MG/ML
0.5 INJECTION INTRAMUSCULAR ONCE
Status: DISCONTINUED | OUTPATIENT
Start: 2024-05-07 | End: 2024-05-07

## 2024-05-07 RX ORDER — LORAZEPAM 2 MG/ML
1 INJECTION INTRAMUSCULAR ONCE
Status: DISCONTINUED | OUTPATIENT
Start: 2024-05-07 | End: 2024-05-07

## 2024-05-07 RX ORDER — DIPHENHYDRAMINE HCL 50 MG
50 CAPSULE ORAL EVERY 4 HOURS PRN
Status: DISCONTINUED | OUTPATIENT
Start: 2024-05-07 | End: 2024-05-09 | Stop reason: HOSPADM

## 2024-05-07 RX ORDER — ACETAMINOPHEN 325 MG/1
650 TABLET ORAL EVERY 4 HOURS PRN
Status: DISCONTINUED | OUTPATIENT
Start: 2024-05-07 | End: 2024-05-09 | Stop reason: HOSPADM

## 2024-05-07 RX ORDER — LORAZEPAM 0.5 MG/1
0.5 TABLET ORAL AS NEEDED
Status: ON HOLD | COMMUNITY
End: 2024-05-08

## 2024-05-07 RX ORDER — CITALOPRAM 20 MG/1
20 TABLET ORAL DAILY
COMMUNITY
End: 2024-05-09 | Stop reason: HOSPADM

## 2024-05-07 RX ORDER — KETAMINE HYDROCHLORIDE 50 MG/ML
180 INJECTION, SOLUTION INTRAMUSCULAR; INTRAVENOUS ONCE
Status: DISCONTINUED | OUTPATIENT
Start: 2024-05-07 | End: 2024-05-07 | Stop reason: HOSPADM

## 2024-05-07 RX ORDER — ALUMINA, MAGNESIA, AND SIMETHICONE 2400; 2400; 240 MG/30ML; MG/30ML; MG/30ML
15 SUSPENSION ORAL EVERY 6 HOURS PRN
Status: DISCONTINUED | OUTPATIENT
Start: 2024-05-07 | End: 2024-05-09 | Stop reason: HOSPADM

## 2024-05-07 RX ORDER — ACETAMINOPHEN 325 MG/1
650 TABLET ORAL EVERY 4 HOURS PRN
Status: CANCELLED | OUTPATIENT
Start: 2024-05-07

## 2024-05-07 RX ORDER — LOPERAMIDE HYDROCHLORIDE 2 MG/1
2 CAPSULE ORAL
Status: CANCELLED | OUTPATIENT
Start: 2024-05-07

## 2024-05-07 RX ORDER — NICOTINE 21 MG/24HR
1 PATCH, TRANSDERMAL 24 HOURS TRANSDERMAL DAILY PRN
Status: DISCONTINUED | OUTPATIENT
Start: 2024-05-07 | End: 2024-05-09 | Stop reason: HOSPADM

## 2024-05-07 RX ORDER — ALUMINA, MAGNESIA, AND SIMETHICONE 2400; 2400; 240 MG/30ML; MG/30ML; MG/30ML
15 SUSPENSION ORAL EVERY 6 HOURS PRN
Status: CANCELLED | OUTPATIENT
Start: 2024-05-07

## 2024-05-07 RX ORDER — TRAZODONE HYDROCHLORIDE 100 MG/1
100 TABLET ORAL NIGHTLY PRN
Status: CANCELLED | OUTPATIENT
Start: 2024-05-07

## 2024-05-07 RX ORDER — LORAZEPAM 2 MG/1
2 TABLET ORAL EVERY 4 HOURS PRN
Status: CANCELLED | OUTPATIENT
Start: 2024-05-07 | End: 2024-05-14

## 2024-05-07 RX ORDER — MIDAZOLAM HYDROCHLORIDE 2 MG/2ML
5 INJECTION, SOLUTION INTRAMUSCULAR; INTRAVENOUS ONCE
Status: DISCONTINUED | OUTPATIENT
Start: 2024-05-07 | End: 2024-05-07

## 2024-05-07 RX ORDER — TRAZODONE HYDROCHLORIDE 100 MG/1
100 TABLET ORAL NIGHTLY PRN
Status: DISCONTINUED | OUTPATIENT
Start: 2024-05-07 | End: 2024-05-09 | Stop reason: HOSPADM

## 2024-05-07 RX ORDER — BUPRENORPHINE HYDROCHLORIDE AND NALOXONE HYDROCHLORIDE DIHYDRATE 8; 2 MG/1; MG/1
2 TABLET SUBLINGUAL DAILY
COMMUNITY
Start: 2024-04-10

## 2024-05-07 RX ORDER — LORAZEPAM 2 MG/ML
2 INJECTION INTRAMUSCULAR ONCE
Status: COMPLETED | OUTPATIENT
Start: 2024-05-07 | End: 2024-05-07

## 2024-05-07 RX ORDER — RISPERIDONE 2 MG/1
2 TABLET ORAL 2 TIMES DAILY
Status: CANCELLED | OUTPATIENT
Start: 2024-05-07

## 2024-05-07 RX ADMIN — RISPERIDONE 2 MG: 2 TABLET ORAL at 12:20

## 2024-05-07 RX ADMIN — HYDROXYZINE PAMOATE 50 MG: 50 CAPSULE ORAL at 23:29

## 2024-05-07 RX ADMIN — WATER 20 MG: 1 INJECTION INTRAMUSCULAR; INTRAVENOUS; SUBCUTANEOUS at 01:18

## 2024-05-07 RX ADMIN — TRAZODONE HYDROCHLORIDE 100 MG: 100 TABLET ORAL at 20:45

## 2024-05-07 RX ADMIN — LEVETIRACETAM 750 MG: 500 TABLET, FILM COATED ORAL at 12:20

## 2024-05-07 RX ADMIN — RISPERIDONE 2 MG: 2 TABLET ORAL at 20:41

## 2024-05-07 RX ADMIN — LEVETIRACETAM 750 MG: 500 TABLET, FILM COATED ORAL at 20:40

## 2024-05-07 RX ADMIN — ACETAMINOPHEN 650 MG: 325 TABLET ORAL at 23:29

## 2024-05-07 RX ADMIN — LORAZEPAM 2 MG: 2 INJECTION INTRAMUSCULAR; INTRAVENOUS at 03:58

## 2024-05-07 NOTE — NURSING NOTE
Pt 59 y/o F arrived from ED to  at 1124 for psychosis under a 72 hr hold under the care of Dr. Velasquez, pt arrived with security, clear speech loose associations. Pt irritable and guarded but cooperative, VS done without issues, Pt and belongings search done per protocol. Per ED nurse patient had been having psychosis for 2 days, rambling, Head CT WNL limits, UDS positive for Fentyl, pt was combative last night in the ED and given Ativan and Geodon, Pt is poor historian. Pt arrived with medication issues from [facility], pt [place] is Negative/low risk, pt denies SI/HI and AVH, pt is delusional and paranoid, pt took meds with some education. Unable to obtain further information due to patient mental status. No s/s of distress at this time.

## 2024-05-07 NOTE — ED PROVIDER NOTES
Time: 3:48 AM EDT  Date of encounter:  2024  Independent Historian/Clinical History and Information was obtained by:   Patient and friends    History is limited by: Altered Mental Status    Chief Complaint: AMS      History of Present Illness:  Patient is a 58 y.o. year old female who presents to the emergency department for evaluation of Acute change in mental status.  Per friends at bedside patient has been in a state of psychosis.  This has been going for about 2 days.  Patient is just rambling words and not making sense.  They walked in to her house and found multiple pieces of paper laying around with random words written on them.  Unsure if she had any drug use.  Patient is paranoid and appears to have some hallucinations.  History limited. She does have a psychiatric history    HPI    Patient Care Team  Primary Care Provider: Salomon De La Cruz MD    Past Medical History:     Allergies   Allergen Reactions    Morphine Hives    Sulfa Antibiotics Hives     Past Medical History:   Diagnosis Date    Allergic rhinitis     Anxiety     Hyperlipidemia 2021    Pain, lumbar region     Seizures     pt unsure, only one episode    Trauma      Past Surgical History:   Procedure Laterality Date    BREAST IMPLANT SURGERY Bilateral      SECTION      COLONOSCOPY  2017    Dr. Clarke    HYSTERECTOMY       Family History   Problem Relation Age of Onset    Cancer Other     Heart disease Other         Cerebrovascular disease    Diabetes Other     Colon cancer Other         Uncle    Breast cancer Neg Hx     Uterine cancer Neg Hx     Ovarian cancer Neg Hx     Deep vein thrombosis Neg Hx     Pulmonary embolism Neg Hx        Home Medications:  Prior to Admission medications    Medication Sig Start Date End Date Taking? Authorizing Provider   ALPRAZolam (XANAX) 1 MG tablet Take 0.5 tablets by mouth 2 (Two) Times a Day As Needed for Anxiety.  Patient not taking: Reported on 2024 6/3/23   Reji Gilbert DO  "  azithromycin (Zithromax Z-Edil) 250 MG tablet Take 2 tablets by mouth on day 1, then 1 tablet daily on days 2-5  Patient not taking: Reported on 7/12/2023 7/5/23   Bret Mariee MD   busPIRone (BUSPAR) 5 MG tablet Take 1 tablet by mouth 3 (Three) Times a Day.  Patient not taking: Reported on 1/19/2024 7/12/23   Bret Mariee MD   CeleXA 20 MG tablet 1 tablet. 1/8/24   Marina Bruce MD   cyclobenzaprine (FLEXERIL) 10 MG tablet TAKE 1 TABLET BY MOUTH 2 TIMES A DAY AS NEEDED FOR MUSCLE SPASMS.  Patient not taking: Reported on 1/19/2024 10/16/23   Bret Mariee MD   doxepin (SINEquan) 50 MG capsule Take 1 capsule by mouth every night at bedtime. 11/10/23   Marina Bruce MD   estradiol (Estrace) 1 MG tablet Take 1 tablet by mouth Daily. 6/27/23   Gail Shetty APRN   hydrOXYzine pamoate (VISTARIL) 50 MG capsule Take 1 capsule by mouth 3 (Three) Times a Day As Needed for Itching. 1/19/24   Preet Justice MD   levETIRAcetam (KEPPRA) 750 MG tablet TAKE 1 TABLET BY MOUTH 2 TIMES A DAY  Patient not taking: Reported on 1/19/2024 9/12/23   Bret Mariee MD   loratadine (CLARITIN) 10 MG tablet Take 1 tablet by mouth Daily.  Patient not taking: Reported on 1/19/2024    Marina Bruce MD   Remeron 15 MG tablet 1 tablet. 1/8/24   Marina Bruce MD        Social History:   Social History     Tobacco Use    Smoking status: Never    Smokeless tobacco: Never   Vaping Use    Vaping status: Never Used   Substance Use Topics    Alcohol use: Not Currently     Comment: Quit drinking at 14    Drug use: Not Currently     Comment: HX of use of xanax         Review of Systems:  Review of Systems   Unable to perform ROS: Mental status change        Physical Exam:  /64 (BP Location: Right arm, Patient Position: Lying)   Pulse 101   Temp 97.9 °F (36.6 °C) (Oral)   Resp 14   Ht 147.3 cm (58\")   Wt 51.8 kg (114 lb 3.2 oz)   SpO2 99%   BMI 23.87 kg/m²     Physical Exam  HENT:      Head: Normocephalic " and atraumatic.      Mouth/Throat:      Mouth: Mucous membranes are moist.   Eyes:      Extraocular Movements: Extraocular movements intact.      Pupils: Pupils are equal, round, and reactive to light.   Cardiovascular:      Rate and Rhythm: Regular rhythm. Tachycardia present.      Heart sounds: Normal heart sounds.   Pulmonary:      Effort: Pulmonary effort is normal.   Abdominal:      Palpations: Abdomen is soft.   Musculoskeletal:         General: Normal range of motion.      Cervical back: Normal range of motion.   Skin:     General: Skin is warm.   Neurological:      Mental Status: She is alert. She is confused.   Psychiatric:         Speech: Speech is rapid and pressured.         Behavior: Behavior is agitated.                  Procedures:  Procedures      Medical Decision Making:      Comorbidities that affect care:    Anxiety    External Notes reviewed:    Previous Clinic Note: Patient seen by her PCP on 7/12/2023 for anxiety, insomnia and muscle spasms.      The following orders were placed and all results were independently analyzed by me:  Orders Placed This Encounter   Procedures    CT Head Without Contrast    Supply Draw    Comprehensive Metabolic Panel    Ethanol    Urine Drug Screen - Urine, Clean Catch    Acetaminophen Level    Salicylate Level    TSH    T4, Free    CBC Auto Differential    CK    CBC & Differential    Green Top (Gel)    Lavender Top    Gold Top - SST    Light Blue Top       Medications Given in the Emergency Department:  Medications   ziprasidone (GEODON) 20 mg in sterile water (preservative free) 1 mL injection (20 mg Intramuscular Given 5/7/24 0118)   LORazepam (ATIVAN) injection 2 mg (2 mg Intramuscular Given 5/7/24 0358)        ED Course:         Labs:    Lab Results (last 24 hours)       Procedure Component Value Units Date/Time    CBC & Differential [[id number]]  (Abnormal) Collected: 05/07/24 0217    Specimen: Blood Updated: 05/07/24 0225    Narrative:      The following  orders were created for panel order CBC & Differential.  Procedure                               Abnormality         Status                     ---------                               -----------         ------                     CBC Auto Differential[[id number]]        Abnormal            Final result                 Please view results for these tests on the individual orders.    Comprehensive Metabolic Panel [[id number]]  (Abnormal) Collected: 05/07/24 0217    Specimen: Blood Updated: 05/07/24 0245     Glucose 89 mg/dL      BUN 17 mg/dL      Creatinine 0.97 mg/dL      Sodium 139 mmol/L      Potassium 3.9 mmol/L      Chloride 101 mmol/L      CO2 19.5 mmol/L      Calcium 10.0 mg/dL      Total Protein 8.2 g/dL      Albumin 4.7 g/dL      ALT (SGPT) 9 U/L      AST (SGOT) 19 U/L      Alkaline Phosphatase 64 U/L      Total Bilirubin 0.2 mg/dL      Globulin 3.5 gm/dL      A/G Ratio 1.3 g/dL      BUN/Creatinine Ratio 17.5     Anion Gap 18.5 mmol/L      eGFR 67.9 mL/min/1.73     Narrative:      GFR Normal >60  Chronic Kidney Disease <60  Kidney Failure <15      Ethanol [[id number]] Collected: 05/07/24 0217    Specimen: Blood Updated: 05/07/24 0245     Ethanol <10 mg/dL      Ethanol % <0.010 %     Narrative:      Ethanol (Plasma)  <10 Essentially Negative    Toxic Concentrations           mg/dL    Flushing, slowing of reflexes    Impaired visual activity         Depression of CNS              >100  Possible Coma                  >300       Urine Drug Screen - Straight Cath [[id number]]  (Abnormal) Collected: 05/07/24 0217    Specimen: Urine from Straight Cath Updated: 05/07/24 0248     Amphet/Methamphet, Screen Negative     Barbiturates Screen, Urine Negative     Benzodiazepine Screen, Urine Positive     Cocaine Screen, Urine Negative     Opiate Screen Negative     THC, Screen, Urine Negative     Methadone Screen, Urine Negative     Oxycodone Screen, Urine Negative     Fentanyl, Urine Positive    Narrative:       Negative Thresholds Per Drugs Screened:    Amphetamines                 500 ng/ml  Barbiturates                 200 ng/ml  Benzodiazepines              100 ng/ml  Cocaine                      300 ng/ml  Methadone                    300 ng/ml  Opiates                      300 ng/ml  Oxycodone                    100 ng/ml  THC                           50 ng/ml  Fentanyl                       5 ng/ml      The Normal Value for all drugs tested is negative. This report includes final unconfirmed screening results to be used for medical treatment purposes only. Unconfirmed results must not be used for non-medical purposes such as employment or legal testing. Clinical consideration should be applied to any drug of abuse test, particularly when unconfirmed results are used.            Acetaminophen Level [[id number]]  (Normal) Collected: 05/07/24 0217    Specimen: Blood Updated: 05/07/24 0245     Acetaminophen <5.0 mcg/mL     Salicylate Level [[id number]]  (Normal) Collected: 05/07/24 0217    Specimen: Blood Updated: 05/07/24 0245     Salicylate <0.3 mg/dL     TSH [[id number]]  (Normal) Collected: 05/07/24 0217    Specimen: Blood Updated: 05/07/24 0252     TSH 0.332 uIU/mL     T4, Free [[id number]]  (Normal) Collected: 05/07/24 0217    Specimen: Blood Updated: 05/07/24 0252     Free T4 1.26 ng/dL     CBC Auto Differential [[id number]]  (Abnormal) Collected: 05/07/24 0217    Specimen: Blood Updated: 05/07/24 0225     WBC 9.00 10*3/mm3      RBC 3.96 10*6/mm3      Hemoglobin 11.8 g/dL      Hematocrit 35.5 %      MCV 89.6 fL      MCH 29.8 pg      MCHC 33.2 g/dL      RDW 13.2 %      RDW-SD 43.1 fl      MPV 9.0 fL      Platelets 377 10*3/mm3      Neutrophil % 75.9 %      Lymphocyte % 17.1 %      Monocyte % 6.2 %      Eosinophil % 0.2 %      Basophil % 0.3 %      Immature Grans % 0.3 %      Neutrophils, Absolute 6.82 10*3/mm3      Lymphocytes, Absolute 1.54 10*3/mm3      Monocytes, Absolute 0.56 10*3/mm3      Eosinophils, Absolute  0.02 10*3/mm3      Basophils, Absolute 0.03 10*3/mm3      Immature Grans, Absolute 0.03 10*3/mm3      nRBC 0.0 /100 WBC     CK [[id number]]  (Normal) Collected: 05/07/24 0217    Specimen: Blood Updated: 05/07/24 0428     Creatine Kinase 75 U/L              Imaging:    CT Head Without Contrast    Result Date: 5/7/2024  CT HEAD WO CONTRAST-  Date of Exam: 5/7/2024 5:40 AM  Indication: ams (altered mental status)  Comparison: 6/2/2023.  Technique: Axial CT images were obtained of the head without contrast administration.  Reconstructed 2D coronal and sagittal images were also obtained. Automated exposure control and iterative construction methods were used.  FINDINGS: A routine nonenhanced head CT was performed. No acute brain abnormality is seen. No acute infarct. No acute intracranial hemorrhage. No midline shift or acute intracranial mass effect is seen. The ventricular size and configuration are within normal limits. No acute skull fracture is identified. There may be cerebellar tonsillar ectopia. Benign external auditory canal debris is suspected. There is chronic rightward nasoseptal deviation. 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 No significant acute findings are seen with regard to the imaged orbits, paranasal sinuses, or middle ear clefts.      No acute brain abnormality is seen.     Please note that portions of this note were completed with a voice recognition program.     Electronically Signed By-Luis F Pate MD On:5/7/2024 6:33 AM         Differential Diagnosis and Discussion:    Altered Mental Status: Based on the patient's signs and symptoms, differential diagnosis includes but is not limited to meningitis, stroke, sepsis, subarachnoid hemorrhage, intracranial bleeding, encephalitis, and metabolic encephalopathy.    All labs were reviewed and interpreted by me.  EKG was interpreted by me.  CT scan radiology impression was interpreted by  me.    MDM  Number of Diagnoses or Management Options  Psychosis, unspecified psychosis type  Diagnosis management comments: On arrival patient is altered.  She has pressured speech.  She appears to have acute psychosis.  Labs show no significant abnormality.  Tox screen positive for fentanyl and benzodiazepines.  Patient does have a prescription for benzodiazepines.  CT of her head showed no acute finding.  Due to agitation patient was given multiple dose of medication to help with agitation.  Patient was discussed with psychiatrist will admit to [facility] for further care.       Amount and/or Complexity of Data Reviewed  Clinical lab tests: reviewed  Tests in the radiology section of CPT®: reviewed  Review and summarize past medical records: yes  Independent visualization of images, tracings, or specimens: yes    Risk of Complications, Morbidity, and/or Mortality  Presenting problems: moderate  Management options: moderate                 Patient Care Considerations:    ANTIBIOTICS: I considered prescribing antibiotics as an outpatient however no bacterial focus of infection was found.      Consultants/Shared Management Plan:    Consultant: I have discussed the case with Dr Doss who states will admit to [facility]    Social Determinants of Health:    Patient has presented with family members who are responsible, reliable and will ensure follow up care.      Disposition and Care Coordination:    Psychiatric Admission: Through independent evaluation of the patient's history and physical and consultation with psychiatry, the patient meets criteria for admission to a psychiatric facility.        Final diagnoses:   Psychosis, unspecified psychosis type        ED Disposition       ED Disposition   DC/Transfer to Behavioral Health    Condition   Stable    Comment   --               This medical record created using voice recognition software.             Sia Fitzgerald MD  05/07/24 5616

## 2024-05-08 PROBLEM — F11.10 OPIOID ABUSE: Status: ACTIVE | Noted: 2024-05-08

## 2024-05-08 PROBLEM — J30.9 ALLERGIC RHINITIS: Status: ACTIVE | Noted: 2024-05-08

## 2024-05-08 PROBLEM — F13.10 BENZODIAZEPINE ABUSE: Status: ACTIVE | Noted: 2024-05-08

## 2024-05-08 PROBLEM — G62.9 NEUROPATHY: Status: ACTIVE | Noted: 2024-05-08

## 2024-05-08 RX ORDER — BUPRENORPHINE HYDROCHLORIDE AND NALOXONE HYDROCHLORIDE DIHYDRATE 8; 2 MG/1; MG/1
1 TABLET SUBLINGUAL 2 TIMES DAILY
Status: DISCONTINUED | OUTPATIENT
Start: 2024-05-08 | End: 2024-05-09 | Stop reason: HOSPADM

## 2024-05-08 RX ADMIN — LEVETIRACETAM 750 MG: 500 TABLET, FILM COATED ORAL at 21:19

## 2024-05-08 RX ADMIN — RISPERIDONE 2 MG: 2 TABLET ORAL at 21:19

## 2024-05-08 RX ADMIN — RISPERIDONE 2 MG: 2 TABLET ORAL at 08:11

## 2024-05-08 RX ADMIN — LEVETIRACETAM 750 MG: 500 TABLET, FILM COATED ORAL at 08:11

## 2024-05-08 RX ADMIN — BUPRENORPHINE AND NALOXONE 1 TABLET: 8; 2 TABLET SUBLINGUAL at 21:19

## 2024-05-08 NOTE — H&P
"[facility]   PSYCHIATRIC  HISTORY AND PHYSICAL    Patient Name: Lisa To  : 1965  MRN: [id number]  Primary Care Physician:  Salomon De La Cruz MD  Date of admission: 2024    Subjective   Subjective     Chief Complaint: \"Withdrawal\"    HPI:     Lisa To is a 58 y.o. female with a history of hyperlipidemia, allergic rhinitis, substance use, and mood disorder.  Patient was brought into the hospital and admitted on a 72-hour hold after her neighbors called an ambulance for bizarre behavior.    Patient states that she has a history of substance use and has been on Suboxone.  She reports that she ran out of Suboxone early and does not know why.  She states there is no explanation for it and she is afraid someone stole some of her medicine.  Patient reports that she is taking 8/2 of Suboxone twice daily.  Per Girish report she has been receiving this on a regular basis.  Patient has been under the care of intensive [facility].  Patient minimizing substance use.  Toxicology screen was positive for benzodiazepines, but she has not received the prescription for more than a month and received a very small number of pills.  She was also positive for fentanyl.    Patient reports that she has had decreased sleep and states that she has insomnia.  She reports decreased energy.  She denies auditory visual loose nations.  When asked about suicidal ideation she responds, \"I do not think that way.\"    Patient denies auditory visualizations but reports that there is things going on that are \"creepy.\"  She cannot fully describe what she means by this.    Patient is conversational and participates with no acute agitation or restlessness.  However her thought processes are somewhat bizarre.  She will make occasional statements that are out of context and have little meaning.  She is also somewhat over inclusive and at times he gets tangential and adds much of information that has little " relevance.  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    Has a history of withdrawal seizures.  She is on Keppra and states that that is for neuropathy and not for seizure disorder.  States that she has not had a seizure in a very long time.    Review of Systems:      CONSTITUTIONAL: Feels well denies any acute medical problems  PSYCHIATRIC: As documented in HPI    Personal History     Past Medical History:   Diagnosis Date    Allergic rhinitis     Anxiety     Hyperlipidemia 2021    Pain, lumbar region     Seizures     pt unsure, only one episode    Trauma        Past Surgical History:   Procedure Laterality Date    BREAST IMPLANT SURGERY Bilateral      SECTION      COLONOSCOPY  2017    Dr. Clarke    HYSTERECTOMY         Past Psychiatric History: Currently under the care of intensive health    Psychiatric Hospitalizations: Has had previous hospitalizations including an admission at [facility] in [place] in January    Suicide Attempts: Denies    Prior Treatment and Medications Tried: Unable to tell me what she has been on      Family History: family history includes Cancer in an other family member; Colon cancer in an other family member; Diabetes in an other family member; Heart disease in an other family member. Otherwise pertinent FHx was reviewed and not pertinent to current issue.    Family Psych History:None known to patient      Family Substance Abuse History:None known to patient      Family Suicide History:None known to patient      Social History:     Born and raised [place].  She is a high school graduate.  He has been  and  on 2 occasions.  Last divorce was in .  She has 2 children.  She is currently unemployed.  She lives alone.    Has never been in the     Identifies as  Ban    Denies any history of abuse    Social History     Socioeconomic History    Marital status:    Tobacco Use    Smoking status: Never    Smokeless tobacco: Never   Vaping Use    Vaping status: Never Used   Substance and Sexual Activity    Alcohol use: Not Currently     Comment: Quit drinking at 14    Drug use: Not Currently     Comment: HX of use of xanax    Sexual activity: Yes     Partners: Male     Birth control/protection: Hysterectomy       Substance Abuse History: reports that she has never smoked. She has never used smokeless tobacco. She reports that she does not currently use alcohol. She reports that she does not currently use drugs.    Home Medications:   buprenorphine-naloxone, busPIRone, citalopram, doxepin, levETIRAcetam, and mirtazapine      Allergies:  Allergies   Allergen Reactions    Morphine Hives    Sulfa Antibiotics Hives       Objective   Objective     Vitals:   Temp:  [97.8 °F (36.6 °C)-97.9 °F (36.6 °C)] 97.8 °F (36.6 °C)  Heart Rate:  [] 98  Resp:  [18-22] 20  BP: (112-123)/(71-81) 112/71    Physical Exam:      CONSTITUTIONAL: Patient is well developed, well nourished, awake and alert.  HEENT: Head and neck are normocephalic and atraumatic.   LUNGS: Even unlabored respirations.  SKIN: Clean, dry, intact.  EXTREMITIES: No clubbing, cyanosis, edema.  MUSCULOSKELETAL: Symmetric body habitus. Spine straight. Strength intact,  NEUROLOGIC: Appropriate. No abnormal movements, good muscle tone.                              Cerebellar: station and gait steady.    Mental Status Exam:      Awake, alert, oriented female appears appropriate stated age.  She does participate in exam but has somewhat of a bizarre demeanor.  There is no sensorium deficits.  She is a fair but rather limited historian.  She has been up and out in milieu and engaged with peers and staff in appropriate fashion    Hygiene:   good  Cooperation:  Cooperative  Eye Contact:  Good  Psychomotor Behavior:   "Appropriate  Affect:  Blunted  Mood: \"Not depressed\"  Speech:  Normal  Language: Appropriate  Thought Process:  Goal directed, Tangential, and often adds random thoughts that are out of context conversation  Thought Content:  Normal and somewhat bizarre at times  Suicidal:  None  Homicidal:  None  Hallucinations:  None  Delusion:  None  Memory:  Intact  Orientation:  Person, Place, Time, and Situation  Reliability:   Limited  Insight:  Poor  Judgement:  Impaired  Impulse Control:  Impaired        Result Review    Result Review:  I have personally reviewed the results from the time of this admission to 5/8/2024 16:44 EDT and agree with these findings:  [x]  Laboratory  []  Microbiology  []  Radiology  []  EKG/Telemetry   []  Cardiology/Vascular   []  Pathology  []  Old records  []  Other:  Most notable findings include: Positive for benzodiazepines and fentanyl    Assessment & Plan   Assessment / Plan     Brief Patient Summary:  Lisa To is a 58 y.o. female who admitted on a 72-hour hold for bizarre behavior and psychosis.  She has been using substances.    Active Hospital Problems:  Active Hospital Problems    Diagnosis     **Psychosis     Neuropathy     Opioid abuse     Benzodiazepine abuse        Plan:   Risperdal has been started for psychosis and bizarre behavior  We will reinitiate Suboxone as she has been on this and had a prescription within the last 30 days  Start back home meds including Keppra  Work on mood stabilization  Admit for safety and stabilization and begin treatment for underlying mood disorder or psychosis with appropriate medications  Attempt to gain collateral information of possible  Work on safety plan  Provide supportive therapy  Patient to engage in all group and individual treatment modalities available including milieu therapy  Work on appropriate disposition follow-up  Estimated length of stay in hospital 4 to 5 days      DVT prophylaxis:  Mechanical DVT prophylaxis orders " are present.        CODE STATUS:    Code Status (Patient has no pulse and is not breathing): CPR (Attempt to Resuscitate)  Medical Interventions (Patient has pulse or is breathing): Full Support      Admission Status:  I believe this patient meets inpatient status.      Part of this note may be an electronic transcription/translation of spoken language to printed text using the Dragon dictation system.        Electronically signed by Pranay Velasquez MD, 05/08/24, 4:39 PM EDT.

## 2024-05-08 NOTE — PLAN OF CARE
"Goal Outcome Evaluation:  Plan of Care Reviewed With: patient  Patient Agreement with Plan of Care: agrees         Nursing Note:  pt reports that her friend brought her to the ER.  States her friend \"flipped the script on her\".  States she resides in a nice camper.  States voices talk to her.  States \"it's god\" and they are good voices.  Also voices discuss family matters.  States it is like there is vibration on her eardrums.    Denies visual hallucinations.  States she was  for 38 years and she wasn't prepared.  Thinks her divorce was in 2022.  Pt cooperative with am medications.  Pt states she sees a dr. Salomon almodovar at [facility] in [place], [place] and was placed on Suboxone.  Will continue to monitor mood and behavior.  Treatment plan is ongoing.                                 "

## 2024-05-08 NOTE — PLAN OF CARE
"Goal Outcome Evaluation:  Plan of Care Reviewed With: patient  Patient Agreement with Plan of Care: agrees   Pt rated anxiety \"high\" and depression as \"depressed.\" Denied AVH, SI, HI. Pt heavily focused on discharge stating she has a 10 am appointment with her doctor that she has to get to. Tangential. Pt stated that the  needed to talk to her at 0730 in the morning and the doctor wouldn't be here until 1230 but she needed discharged in time for her appointment. Carries blanket around her and walks hallway, in/out of dayroom. Ate snack. Asked for trazodone, administer. Loose associations present at times.                                      "

## 2024-05-09 VITALS
TEMPERATURE: 98.2 F | BODY MASS INDEX: 23.97 KG/M2 | SYSTOLIC BLOOD PRESSURE: 92 MMHG | HEIGHT: 58 IN | DIASTOLIC BLOOD PRESSURE: 52 MMHG | WEIGHT: 114.2 LBS | RESPIRATION RATE: 16 BRPM | HEART RATE: 89 BPM | OXYGEN SATURATION: 99 %

## 2024-05-09 RX ORDER — RISPERIDONE 2 MG/1
2 TABLET ORAL 2 TIMES DAILY
Qty: 60 TABLET | Refills: 1 | Status: SHIPPED | OUTPATIENT
Start: 2024-05-09

## 2024-05-09 RX ADMIN — RISPERIDONE 2 MG: 2 TABLET ORAL at 09:41

## 2024-05-09 RX ADMIN — LEVETIRACETAM 750 MG: 500 TABLET, FILM COATED ORAL at 09:41

## 2024-05-09 RX ADMIN — BUPRENORPHINE AND NALOXONE 1 TABLET: 8; 2 TABLET SUBLINGUAL at 09:41

## 2024-05-09 NOTE — PLAN OF CARE
"Goal Outcome Evaluation:  Plan of Care Reviewed With: patient  Patient Agreement with Plan of Care: agrees  Pt describes her mood as \"very good\". Denies suicidal and homicidal ideations, denies hallucinations.Pt out of her room later part of shift working on a puzzle with peers.Treatment plan onging.                                         "

## 2024-05-09 NOTE — DISCHARGE SUMMARY
[facility]         DISCHARGE SUMMARY    Patient Name: Lisa To  : 1965  MRN: [id number]    Date of Admission: 2024  Date of Discharge: 2024  Primary Care Physician: Salomon De La Cruz MD    Consults       No orders found from 2024 to 2024.            Presenting Problem:   Psychosis [F29]    Active and Resolved Hospital Problems:  Active Hospital Problems    Diagnosis POA   • **Psychosis [F29] Yes   • Neuropathy [G62.9] Yes   • Opioid abuse [F11.10] Yes   • Benzodiazepine abuse [F13.10] Yes      Resolved Hospital Problems   No resolved problems to display.         Hospital Course     Hospital Course:  Lisa To is a 58 y.o. female with a long history of substance use, depression, and psychotic disorder that was admitted on a 72-hour hold after her neighbors called because of her disorder behavior.  Patient noted to be actively hallucinating and acting bizarre.    Toxicology was positive for fentanyl and benzodiazepines.  Patient initially was very disorganized and difficult to redirect on the unit.  She was tangential and disorganized in her thoughts.  She appeared to be hallucinating and was delusional and paranoid.    She was started on Risperdal 2 mg twice daily.  She tolerated this medication well.  Her mood and affect of significant improved.  Thought processes improved and her psychosis has improved significantly.  She reports she continues to hear some voices but that this is normal she hears these all the time at home and they are not troublesome and are more in the background.    She remained free of suicidal ideations throughout her stay.  Patient with long history of substance and has declined referral to drug and alcohol rehabilitation.  Patient has been up and out in milieu.  She been calm and engaging.  She reports her mood is good.  Has been free of suicidal ideations throughout her stay.  Staff reports she is up and out in the  "milieu.  Rating her depression as a 0.  Patient has been asking for discharge.  At this point she has detox from fentanyl and benzos [facility] no signs of withdrawal.  She has not required any medicines for symptoms of withdrawal.  Patient at this point can be managed as an outpatient and will discharge home        On day of discharge patient is calm, cooperative, engaging, and has no acute agitation or restlessness.  Speech is normal rate and volume and language is appropriate, relevant.  Mood is described as \"I am in a good mood\" and affect is euthymic.  Thought processes are slightly tangential at times circumferential, but overall goal directed, and thought content is negative for suicidal or homicidal ideations she reports some hallucinations that are not troublesome and are always present.  Insight is fair, and judgment is intact with no behavioral disturbance.      DISCHARGE Follow Up Recommendations for labs and diagnostics: Routine follow-up with primary care for general health maintenance, lipid and glucose monitoring, sobriety, [facility]      Day of Discharge     Vital Signs:  Temp:  [98.2 °F (36.8 °C)] 98.2 °F (36.8 °C)  Heart Rate:  [89-98] 89  Resp:  [16-20] 16  BP: ()/(52-75) 92/52      Pertinent  and/or Most Recent Results     LAB RESULTS:      Lab 05/07/24 0217   WBC 9.00   HEMOGLOBIN 11.8*   HEMATOCRIT 35.5   PLATELETS 377   NEUTROS ABS 6.82   IMMATURE GRANS (ABS) 0.03   LYMPHS ABS 1.54   MONOS ABS 0.56   EOS ABS 0.02   MCV 89.6         Lab 05/07/24 0217   SODIUM 139   POTASSIUM 3.9   CHLORIDE 101   CO2 19.5*   ANION GAP 18.5*   BUN 17   CREATININE 0.97   EGFR 67.9   GLUCOSE 89   CALCIUM 10.0   TSH 0.332         Lab 05/07/24 0217   TOTAL PROTEIN 8.2   ALBUMIN 4.7   GLOBULIN 3.5   ALT (SGPT) 9   AST (SGOT) 19   BILIRUBIN 0.2   ALK PHOS 64                                     Lab 05/07/24 0217   ETHANOL PCT <0.010   ETHANOL MGDL <10         Lab 05/07/24 0217   AMPH/METHAM " SCREEN, URINE Negative   BENZODIAZEPINE SCREEN, URINE Positive*   COCAINE SCREEN, URINE Negative   OPIATES Negative   THC URINE SCREEN Negative   METHADONE SCREEN, URINE Negative     Brief Urine Lab Results  (Last result in the past 365 days)        Color   Clarity   Blood   Leuk Est   Nitrite   Protein   CREAT   Urine HCG        06/27/23 1525 Dark Yellow   Clear   Negative   Trace   Positive   Trace                      CT Head Without Contrast    Result Date: 5/7/2024  Impression: No acute brain abnormality is seen.     Please note that portions of this note were completed with a voice recognition program.     Electronically Signed By-Luis F Pate MD On:5/7/2024 6:33 AM                   Imaging Results (Last 7 Days)       ** No results found for the last 168 hours. **             Labs Pending at Discharge:           Discharge Details        Discharge Medications        New Medications        Instructions Start Date   risperiDONE 2 MG tablet  Commonly known as: risperDAL   2 mg, Oral, 2 Times Daily             Continue These Medications        Instructions Start Date   buprenorphine-naloxone 8-2 MG per SL tablet  Commonly known as: SUBOXONE   2 tablets, Sublingual, Daily      levETIRAcetam 750 MG tablet  Commonly known as: KEPPRA   TAKE 1 TABLET BY MOUTH 2 TIMES A DAY             Stop These Medications      busPIRone 15 MG tablet  Commonly known as: BUSPAR     citalopram 20 MG tablet  Commonly known as: CeleXA     doxepin 50 MG capsule  Commonly known as: SINEquan     mirtazapine 15 MG tablet  Commonly known as: REMERON              Allergies   Allergen Reactions   • Morphine Hives   • Sulfa Antibiotics Hives         Discharge Disposition:  Home or Self Care    Diet:  Hospital:  Diet Order   Procedures   • Diet: Regular/House; Fluid Consistency: Thin (IDDSI 0)         Discharge Activity: Ad shanta.  Activity Instructions       Activity as Tolerated              Discharge Condition: Stable    CODE STATUS:  Code  Status and Medical Interventions:   Ordered at: 05/07/24 1135     Code Status (Patient has no pulse and is not breathing):    CPR (Attempt to Resuscitate)     Medical Interventions (Patient has pulse or is breathing):    Full Support         Future Appointments   Date Time Provider Department Center   6/28/2024  2:30 PM Gail Shetty APRN [facility] OBG [facility]   7/18/2024 12:45 PM Luis Reed MD [facility] ROBERT [facility]       Additional Instructions for the Follow-ups that You Need to Schedule       Discharge Follow-up with PCP   As directed       Currently Documented PCP:    Salomon De La Cruz MD    PCP Phone Number:    [phone]     Follow Up Details: As needed        Discharge Follow-up with Specified Provider: [facility]   As directed      To: [facility]        Discharge Follow-up with Specified Provider: Drug and alcohol rehab   As directed      To: Drug and alcohol rehab                Time spent on Discharge including face to face service: 30 minutes    Part of this note may be an electronic transcription/translation of spoken language to printed text using the Dragon dictation system.        Electronically signed by Pranay Velasquez MD, 05/09/24, 11:01 AM EDT.

## 2024-05-09 NOTE — PLAN OF CARE
Goal Outcome Evaluation:  Plan of Care Reviewed With: patient  Patient Agreement with Plan of Care: agrees                Pt is alert and oriented and able to make needs known.  Pt denies SI or HI.  Pt denies a/v/h.  Pt has been complaint with meds today and verbalizes understanding of all discharge education.  Pt has pleasant affect and reports that she feels ready to go home.  No s/s of acute distress observed at this time.

## 2024-05-09 NOTE — SIGNIFICANT NOTE
05/09/24 1025   Plan   Final Discharge Disposition Code 01 - home or self-care   Final Note Patient will discharge home today. She is active with [facility] and would like a follow up appointment. Pt requesting her medication transferred from [facility] to [facility], as there are transportation barriers that will delay obtaining meds. Pt requesting transportation assistance. She will be provided with a cab voucher. Pt scheduled with [facility] 5-10-24 1pm

## 2024-07-01 ENCOUNTER — TELEPHONE (OUTPATIENT)
Dept: OBSTETRICS AND GYNECOLOGY | Facility: CLINIC | Age: 59
End: 2024-07-01
Payer: MEDICAID

## 2024-10-07 ENCOUNTER — TELEPHONE (OUTPATIENT)
Dept: OBSTETRICS AND GYNECOLOGY | Facility: CLINIC | Age: 59
End: 2024-10-07
Payer: MEDICAID

## 2024-10-07 DIAGNOSIS — N95.1 MENOPAUSAL SYMPTOMS: Primary | ICD-10-CM

## 2024-10-07 RX ORDER — ESTRADIOL 1 MG/1
1 TABLET ORAL DAILY
Qty: 90 TABLET | Refills: 0 | Status: SHIPPED | OUTPATIENT
Start: 2024-10-07

## 2024-10-07 NOTE — TELEPHONE ENCOUNTER
Caller: Lisa To    Relationship: Self    Best call back number: [phone] (home)       Requested Prescriptions:   Requested Prescriptions      No prescriptions requested or ordered in this encounter        Pharmacy where request should be sent:      [facility]/pharmacy #[id number] - [place], [place] - [address] - [phone]  - [phone]       Last office visit with prescribing clinician: 6/27/2023     Next office visit with prescribing clinician: 11/4/2024     Additional details provided by patient: WOULD LIKE TO GET A REFILL ON ESTRADOL AND ZOFRAN PLEASE    Does the patient have less than a 3 day supply:  [x] Yes  [] No    Would you like a call back once the refill request has been completed: [] Yes [x] No    If the office needs to give you a call back, can they leave a voicemail: [x] Yes [] No    Rachel Dorsey Rep   10/07/24 10:00 EDT

## 2024-10-07 NOTE — TELEPHONE ENCOUNTER
Patient requesting refill on Zofran & Estradiol. Okay'd refill on Estradiol ( Estrace) x 1. Rx for Zofran given by another provider.  Last seen 6/27/23.  Next appointment 11/4/24. Pt informed Rx @ pharmacy

## 2024-11-02 NOTE — PLAN OF CARE
Goal Outcome Evaluation:  Plan of Care Reviewed With: patient    No s/s of distress at this time, pt has loose assocations  See Admission Note                                            Headache

## 2025-01-14 ENCOUNTER — TELEPHONE (OUTPATIENT)
Dept: OBSTETRICS AND GYNECOLOGY | Facility: CLINIC | Age: 60
End: 2025-01-14

## 2025-01-14 DIAGNOSIS — N95.1 MENOPAUSAL SYMPTOMS: ICD-10-CM

## 2025-01-14 RX ORDER — ESTRADIOL 1 MG/1
1 TABLET ORAL DAILY
Qty: 90 TABLET | Refills: 0 | Status: CANCELLED | OUTPATIENT
Start: 2025-01-14

## 2025-01-14 NOTE — TELEPHONE ENCOUNTER
Caller: Lisa To    Relationship: Self    Best call back number: [phone]    Requested Prescriptions:     estradiol (Estrace) 1 MG tablet [9967]     Requested Prescriptions      No prescriptions requested or ordered in this encounter        Pharmacy where request should be sent:  [facility] [address]    Last office visit with prescribing clinician: 6/27/2023   Last telemedicine visit with prescribing clinician: Visit date not found   Next office visit with prescribing clinician: 2/6/2025     Additional details provided by patient: PT HAD TO MOVE HER APPT - BUT IS OUT OF HER ESTRACE    Does the patient have less than a 3 day supply:  [x] Yes  [] No    Would you like a call back once the refill request has been completed: [] Yes [x] No    If the office needs to give you a call back, can they leave a voicemail: [] Yes [x] No    Rachel Waggoner Rep   01/14/25 10:21 EST

## 2025-01-15 NOTE — TELEPHONE ENCOUNTER
"Called patient no answer. Sent refill request to Gail.  Per Gail \" We have not seen the patient in over 1 year, she would need fu appt to discuss\".  OKAY FOR HUB TO RELAY.  She has to be seen to get refills.  "

## 2025-01-20 ENCOUNTER — TELEPHONE (OUTPATIENT)
Dept: OBSTETRICS AND GYNECOLOGY | Facility: CLINIC | Age: 60
End: 2025-01-20

## 2025-01-20 NOTE — TELEPHONE ENCOUNTER
Patient called in about prescription, needing to be refilled, Told her she had to have her annual exam in order to get refilled.

## 2025-01-27 NOTE — PROGRESS NOTES
"Chief Complaint   Patient presents with    Annual Exam       HPI:   59 y.o. . Presents for well woman exam. using HRT, s/p HYST BSO, benign indications, endometriosis-, ran out of HRT- estradiol 1 month ago, causing in crease in her joint pain and low energy level, increasing hot flashes, desires to continue HRT, counseled regarding WHI study  Menses:   none  Last pap: normal and not medically indicated   Complaints: left sided pelvic pain, intermittent for 6 months, occurs daily, stinging sensation, hx pelvic adhesions    Resolving diarrhea, occasional diet dependent loose stools or when anxious    Patient reports that she has mild leakage if empty bladder regularly, up during the night to void x 2-4 times. Declines treatment.        Past Medical History:   Diagnosis Date    Allergic rhinitis     Anxiety     Hyperlipidemia 2021    Pain, lumbar region     Seizures     pt unsure, only one episode    Trauma       Past Surgical History:   Procedure Laterality Date    BREAST IMPLANT SURGERY Bilateral      SECTION      COLONOSCOPY  2017    Dr. Clarke    HYSTERECTOMY      BSO, secondary to endometriosis      Family History   Problem Relation Age of Onset    Heart disease Other         Cerebrovascular disease    Diabetes Other     Breast cancer Neg Hx     Uterine cancer Neg Hx     Ovarian cancer Neg Hx     Deep vein thrombosis Neg Hx     Pulmonary embolism Neg Hx      Allergies as of 2025 - Reviewed 2025   Allergen Reaction Noted    Morphine Hives 2021    Sulfa antibiotics Hives 2024        PCP: does manage PMHx and preventative labs    /66   Pulse 75   Ht 147.3 cm (57.99\")   Wt 55.3 kg (122 lb)   Breastfeeding No   BMI 25.51 kg/m²     PHYSICAL EXAM: Chaperone present   General- NAD, alert and oriented, appropriate  Psych- Normal mood, good memory  Neck- No masses, no thyroid enlargement  Lymphatic- No palpable neck, axillary, or groin nodes  CV- Regular " rhythm, no murmurs  Resp- CTA to bases, no wheezes  Abdomen- Soft, non distended, non tender, no masses  Breast left-  Implant, Bilaterally symmetrical, no masses, non tender, no nipple discharge  Breast right- Implant, Bilaterally symmetrical, no masses, non tender, no nipple discharge  External genitalia- Normal female, no lesions  Urethra/meatus- Normal, no masses, non tender, no prolapse  Bladder- Normal, no masses, non tender, no prolapse  Vagina- Normal, mild atrophy, no lesions, no discharge, no prolapse  Cvx- Absent  Uterus- Absent  Adnexa- Absent  Anus/Rectum/Perineum- Not performed  Ext- No edema, no cyanosis    Skin- No lesions, no rashes, no acanthosis nigricans    ASSESSMENT and PLAN:    Diagnoses and all orders for this visit:    1. Well woman exam (Primary)    2. Hormone replacement therapy (HRT)  -     estradiol (ESTRACE) 0.5 MG tablet; Take 1 tablet by mouth Daily.  Dispense: 90 tablet; Refill: 3    3. Pelvic pain  -     Urinalysis With Culture If Indicated - Urine, Clean Catch  -     US Non-ob Transvaginal; Future    4. Screen for STD (sexually transmitted disease)  -     Chlamydia trachomatis, Neisseria gonorrhoeae, PCR - Swab, Cervix    5. Encounter for screening mammogram for malignant neoplasm of breast  -     Mammo Screening Digital Tomosynthesis Bilateral With CAD; Future      Preventative:   BREAST HEALTH- Monthly self breast exam importance and how to reviewed. MMG and/or MRI (prn) reviewed per society guidelines and her individual history. Screening Imaging: Updated today  CERVICAL CANCER Screening- Reviewed current ASCCP guidelines for screening w and wo cotest HPV, age specific.  Screen: Not medically needed  COLON CANCER Screening- Reviewed current medical society guidelines and options.  Screen:  Already up to date  SEXUAL HEALTH: STD screening desired.  Ordered,  Safe sex and condoms  BONE HEALTH- Reviewed current medical society guidelines and options for testing, calcium and vit D  intake.  Weight bearing exercise.  DEXA: Not medically needed  VACCINATIONS Recommended: COVID and booster PRN, Flu annually, Zoster (49yo and older)  Importance discussed, risk being unvaccinated reviewed.  Questions answered  Genetic testing: Does not qualify.  Smoking status- NON SMOKER  Follow up PCP/Specialist PMHx and Labs  PROLAPSE/urinary LEAKAGE discussed.  Reviewed risks, benefits, alternatives, side-effects, efficacy of options: expectant, kegels, biofeedback, pessary, and/or Urogyn referral.Declines treatment urinary symptoms/leakage.    She understands the importance of having any ordered tests to be performed in a timely fashion.  The risks of not performing them include, but are not limited to, advanced cancer stages, bone loss from osteoporosis and/or subsequent increase in morbidity and/or mortality.  She is encouraged to review her results online and/or contact or office if she has questions.   Hx pelvic adhesions, suspect non gyn related stinging pelvic pain, will obtain ultrasound to assess for structural cause      Follow Up:  Return in about 1 year (around 2/6/2026), or if symptoms worsen or fail to improve, for will call with results of ultrasound/treatment recommendaitons.        Gail Shetty, APRN  02/06/2025

## 2025-02-06 ENCOUNTER — OFFICE VISIT (OUTPATIENT)
Dept: OBSTETRICS AND GYNECOLOGY | Facility: CLINIC | Age: 60
End: 2025-02-06
Payer: MEDICAID

## 2025-02-06 VITALS
HEART RATE: 75 BPM | BODY MASS INDEX: 25.61 KG/M2 | SYSTOLIC BLOOD PRESSURE: 118 MMHG | DIASTOLIC BLOOD PRESSURE: 66 MMHG | WEIGHT: 122 LBS | HEIGHT: 58 IN

## 2025-02-06 DIAGNOSIS — Z01.419 WELL WOMAN EXAM: Primary | ICD-10-CM

## 2025-02-06 DIAGNOSIS — Z11.3 SCREEN FOR STD (SEXUALLY TRANSMITTED DISEASE): ICD-10-CM

## 2025-02-06 DIAGNOSIS — Z12.31 ENCOUNTER FOR SCREENING MAMMOGRAM FOR MALIGNANT NEOPLASM OF BREAST: ICD-10-CM

## 2025-02-06 DIAGNOSIS — R10.2 PELVIC PAIN: ICD-10-CM

## 2025-02-06 DIAGNOSIS — Z79.890 HORMONE REPLACEMENT THERAPY (HRT): ICD-10-CM

## 2025-02-06 LAB
BILIRUB UR QL STRIP: NEGATIVE
C TRACH RRNA CVX QL NAA+PROBE: NOT DETECTED
CLARITY UR: CLEAR
COLOR UR: YELLOW
GLUCOSE UR STRIP-MCNC: NEGATIVE MG/DL
HGB UR QL STRIP.AUTO: NEGATIVE
HOLD SPECIMEN: NORMAL
KETONES UR QL STRIP: NEGATIVE
LEUKOCYTE ESTERASE UR QL STRIP.AUTO: NEGATIVE
N GONORRHOEA RRNA SPEC QL NAA+PROBE: NOT DETECTED
NITRITE UR QL STRIP: NEGATIVE
PH UR STRIP.AUTO: 6.5 [PH] (ref 5–8)
PROT UR QL STRIP: NEGATIVE
SP GR UR STRIP: 1.01 (ref 1–1.03)
UROBILINOGEN UR QL STRIP: NORMAL

## 2025-02-06 PROCEDURE — 87591 N.GONORRHOEAE DNA AMP PROB: CPT | Performed by: NURSE PRACTITIONER

## 2025-02-06 PROCEDURE — 87491 CHLMYD TRACH DNA AMP PROBE: CPT | Performed by: NURSE PRACTITIONER

## 2025-02-06 PROCEDURE — 81003 URINALYSIS AUTO W/O SCOPE: CPT | Performed by: NURSE PRACTITIONER

## 2025-02-06 RX ORDER — ESTRADIOL 0.5 MG/1
0.5 TABLET ORAL DAILY
Qty: 90 TABLET | Refills: 3 | Status: SHIPPED | OUTPATIENT
Start: 2025-02-06

## 2025-05-13 ENCOUNTER — RESULTS FOLLOW-UP (OUTPATIENT)
Dept: OBSTETRICS AND GYNECOLOGY | Age: 60
End: 2025-05-13
Payer: MEDICAID

## 2025-05-13 ENCOUNTER — HOSPITAL ENCOUNTER (OUTPATIENT)
Dept: ULTRASOUND IMAGING | Facility: HOSPITAL | Age: 60
Discharge: HOME | End: 2025-05-13
Payer: MEDICAID

## 2025-05-13 ENCOUNTER — HOSPITAL ENCOUNTER (OUTPATIENT)
Dept: MAMMOGRAPHY | Facility: HOSPITAL | Age: 60
Discharge: HOME | End: 2025-05-13
Payer: MEDICAID

## 2025-05-13 DIAGNOSIS — Z12.31 ENCOUNTER FOR SCREENING MAMMOGRAM FOR MALIGNANT NEOPLASM OF BREAST: ICD-10-CM

## 2025-05-13 DIAGNOSIS — R10.2 PELVIC PAIN: ICD-10-CM

## 2025-05-13 DIAGNOSIS — R92.8 ABNORMALITY OF LEFT BREAST ON SCREENING MAMMOGRAM: Primary | ICD-10-CM

## 2025-05-13 PROCEDURE — 77067 SCR MAMMO BI INCL CAD: CPT

## 2025-05-13 PROCEDURE — 76830 TRANSVAGINAL US NON-OB: CPT

## 2025-05-13 PROCEDURE — 77063 BREAST TOMOSYNTHESIS BI: CPT

## 2025-06-04 ENCOUNTER — HOSPITAL ENCOUNTER (OUTPATIENT)
Dept: ULTRASOUND IMAGING | Facility: HOSPITAL | Age: 60
Discharge: HOME | End: 2025-06-04
Payer: MEDICAID

## 2025-06-04 ENCOUNTER — HOSPITAL ENCOUNTER (OUTPATIENT)
Dept: MAMMOGRAPHY | Facility: HOSPITAL | Age: 60
Discharge: HOME | End: 2025-06-04
Payer: MEDICAID

## 2025-06-04 ENCOUNTER — RESULTS FOLLOW-UP (OUTPATIENT)
Dept: OBSTETRICS AND GYNECOLOGY | Age: 60
End: 2025-06-04
Payer: MEDICAID

## 2025-06-04 DIAGNOSIS — R92.8 ABNORMALITY OF LEFT BREAST ON SCREENING MAMMOGRAM: ICD-10-CM

## 2025-06-04 PROCEDURE — 77065 DX MAMMO INCL CAD UNI: CPT

## 2025-06-04 PROCEDURE — 76642 ULTRASOUND BREAST LIMITED: CPT

## 2025-06-04 PROCEDURE — G0279 TOMOSYNTHESIS, MAMMO: HCPCS

## 2025-06-05 ENCOUNTER — TELEPHONE (OUTPATIENT)
Dept: OBSTETRICS AND GYNECOLOGY | Age: 60
End: 2025-06-05

## 2025-06-05 NOTE — TELEPHONE ENCOUNTER
Caller: Lisa To    Relationship: Self    Best call back number: [phone]    What is the best time to reach you: ASAP    Do you know the name of the person who called: ETHAN    What was the call regarding: RESULTS    Is it okay if the provider responds through MyChart: CALL BACK       verbal cues/1 person assist/nonverbal cues (demo/gestures)/supervision